# Patient Record
Sex: MALE | Race: WHITE | NOT HISPANIC OR LATINO | Employment: STUDENT | URBAN - METROPOLITAN AREA
[De-identification: names, ages, dates, MRNs, and addresses within clinical notes are randomized per-mention and may not be internally consistent; named-entity substitution may affect disease eponyms.]

---
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
## 2020-01-17 ENCOUNTER — HOSPITAL ENCOUNTER (EMERGENCY)
Facility: HOSPITAL | Age: 20
Discharge: HOME | End: 2020-01-17
Attending: EMERGENCY MEDICINE
Payer: COMMERCIAL

## 2020-01-17 ENCOUNTER — APPOINTMENT (EMERGENCY)
Dept: RADIOLOGY | Facility: HOSPITAL | Age: 20
End: 2020-01-17
Attending: STUDENT IN AN ORGANIZED HEALTH CARE EDUCATION/TRAINING PROGRAM
Payer: COMMERCIAL

## 2020-01-17 VITALS
DIASTOLIC BLOOD PRESSURE: 60 MMHG | TEMPERATURE: 97.9 F | BODY MASS INDEX: 21 KG/M2 | SYSTOLIC BLOOD PRESSURE: 134 MMHG | RESPIRATION RATE: 17 BRPM | WEIGHT: 150 LBS | HEART RATE: 90 BPM | OXYGEN SATURATION: 97 % | HEIGHT: 71 IN

## 2020-01-17 DIAGNOSIS — T78.40XA ALLERGIC REACTION, INITIAL ENCOUNTER: Primary | ICD-10-CM

## 2020-01-17 DIAGNOSIS — R06.02 SHORTNESS OF BREATH: ICD-10-CM

## 2020-01-17 PROCEDURE — 96374 THER/PROPH/DIAG INJ IV PUSH: CPT

## 2020-01-17 PROCEDURE — 63700000 HC SELF-ADMINISTRABLE DRUG: Performed by: STUDENT IN AN ORGANIZED HEALTH CARE EDUCATION/TRAINING PROGRAM

## 2020-01-17 PROCEDURE — 3E0333Z INTRODUCTION OF ANTI-INFLAMMATORY INTO PERIPHERAL VEIN, PERCUTANEOUS APPROACH: ICD-10-PCS | Performed by: EMERGENCY MEDICINE

## 2020-01-17 PROCEDURE — 71046 X-RAY EXAM CHEST 2 VIEWS: CPT

## 2020-01-17 PROCEDURE — 25000000 HC PHARMACY GENERAL: Performed by: STUDENT IN AN ORGANIZED HEALTH CARE EDUCATION/TRAINING PROGRAM

## 2020-01-17 PROCEDURE — 86618 LYME DISEASE ANTIBODY: CPT | Performed by: STUDENT IN AN ORGANIZED HEALTH CARE EDUCATION/TRAINING PROGRAM

## 2020-01-17 PROCEDURE — 63600000 HC DRUGS/DETAIL CODE: Performed by: STUDENT IN AN ORGANIZED HEALTH CARE EDUCATION/TRAINING PROGRAM

## 2020-01-17 PROCEDURE — 36415 COLL VENOUS BLD VENIPUNCTURE: CPT | Performed by: STUDENT IN AN ORGANIZED HEALTH CARE EDUCATION/TRAINING PROGRAM

## 2020-01-17 PROCEDURE — 99284 EMERGENCY DEPT VISIT MOD MDM: CPT | Mod: 25

## 2020-01-17 RX ORDER — DOXYCYCLINE 40 MG/1
40 CAPSULE ORAL DAILY
COMMUNITY
Start: 2017-11-21 | End: 2020-06-29

## 2020-01-17 RX ORDER — PREDNISONE 10 MG/1
50 TABLET ORAL DAILY
Qty: 20 TABLET | Refills: 0 | Status: SHIPPED | OUTPATIENT
Start: 2020-01-17 | End: 2020-06-29

## 2020-01-17 RX ORDER — DIPHENHYDRAMINE HCL 25 MG
50 CAPSULE ORAL ONCE
Status: COMPLETED | OUTPATIENT
Start: 2020-01-17 | End: 2020-01-17

## 2020-01-17 RX ORDER — IPRATROPIUM BROMIDE AND ALBUTEROL SULFATE 2.5; .5 MG/3ML; MG/3ML
3 SOLUTION RESPIRATORY (INHALATION) ONCE
Status: COMPLETED | OUTPATIENT
Start: 2020-01-17 | End: 2020-01-17

## 2020-01-17 RX ORDER — FLUOXETINE 10 MG/1
10 CAPSULE ORAL DAILY
COMMUNITY
End: 2020-01-29

## 2020-01-17 RX ADMIN — DIPHENHYDRAMINE HYDROCHLORIDE 50 MG: 25 CAPSULE ORAL at 20:30

## 2020-01-17 RX ADMIN — IPRATROPIUM BROMIDE AND ALBUTEROL SULFATE 3 ML: 2.5; .5 SOLUTION RESPIRATORY (INHALATION) at 20:08

## 2020-01-17 RX ADMIN — METHYLPREDNISOLONE SODIUM SUCCINATE 125 MG: 125 INJECTION, POWDER, FOR SOLUTION INTRAMUSCULAR; INTRAVENOUS at 20:06

## 2020-01-17 ASSESSMENT — ENCOUNTER SYMPTOMS
PALPITATIONS: 0
DIFFICULTY URINATING: 0
HEMATURIA: 0
NUMBNESS: 0
SEIZURES: 0
COUGH: 0
ABDOMINAL PAIN: 0
SHORTNESS OF BREATH: 0
VOICE CHANGE: 0
HEADACHES: 1
WEAKNESS: 0
FEVER: 0
BACK PAIN: 0
APPETITE CHANGE: 0
DYSURIA: 0
EYE PAIN: 0
VOMITING: 0
COLOR CHANGE: 0
NAUSEA: 0
ABDOMINAL DISTENTION: 0
TROUBLE SWALLOWING: 1
DIZZINESS: 0
ARTHRALGIAS: 0
CHILLS: 0

## 2020-01-18 NOTE — ED ATTESTATION NOTE
1/17/20209:03 PM  I have personally seen and examined the patient.  I reviewed and agree with the PA/NP/Resident's assessment and plan of care.         Jt Ritter,   01/17/20 2106

## 2020-01-18 NOTE — ED PROVIDER NOTES
HPI     Chief Complaint   Patient presents with   • Allergic Reaction       HPI    Zelalem Kasper is a 19 year old M with PMH of anxiety and small left pneumothorax denting to the emergency room with throat tightening and mild shortness of breath.  Patient reports that he took a new medication, Prozac, this morning and around 7 PM started noticing some mild throat tightening and associated shortness of breath.  Denies any chest pain, abdominal upset, rash or pruritus at this time.  , Patient reports that he has been having a frontal tension felt headache since Monday, states that he went to his PCP who told him to return on Monday for follow-up and further work-up of headache.  Patient denies any visual changes or other focal neurological symptoms at this time. It was not sudden onset, no trauma, not the work headache of his life, gradually getting worse.    Patient History     Past Medical History:   Diagnosis Date   • Acne    • Anxiety    • Pneumothorax        Past Surgical History:   Procedure Laterality Date   • DENTAL SURGERY      wisdom teeth removed       History reviewed. No pertinent family history.    Social History     Tobacco Use   • Smoking status: Never Smoker   • Smokeless tobacco: Never Used   Substance Use Topics   • Alcohol use: No   • Drug use: No       Systems Reviewed from Nursing Triage:          Review of Systems     Review of Systems   Constitutional: Negative for appetite change, chills and fever.   HENT: Positive for trouble swallowing. Negative for congestion, ear pain and voice change.    Eyes: Negative for pain and visual disturbance.   Respiratory: Negative for cough and shortness of breath.    Cardiovascular: Negative for chest pain and palpitations.   Gastrointestinal: Negative for abdominal distention, abdominal pain, nausea and vomiting.   Genitourinary: Negative for difficulty urinating, dysuria and hematuria.   Musculoskeletal: Negative for arthralgias and back pain.   Skin: Negative  "for color change and rash.   Allergic/Immunologic: Negative for immunocompromised state.   Neurological: Positive for headaches. Negative for dizziness, seizures, syncope, weakness and numbness.   All other systems reviewed and are negative.       Physical Exam     ED Triage Vitals [01/17/20 1947]   Temp Heart Rate Resp BP SpO2   36.6 °C (97.9 °F) 90 14 (!) 152/75 100 %      Temp Source Heart Rate Source Patient Position BP Location FiO2 (%) (Set)   Oral -- Sitting Right upper arm --                     Patient Vitals for the past 24 hrs:   BP Temp Temp src Pulse Resp SpO2 Height Weight   01/17/20 1947 (!) 152/75 36.6 °C (97.9 °F) Oral 90 14 100 % -- --   01/17/20 1946 -- -- -- -- -- -- 1.803 m (5' 11\") 68 kg (150 lb)                                       Physical Exam   Constitutional: He is oriented to person, place, and time. He appears well-developed and well-nourished.   HENT:   Head: Normocephalic and atraumatic.   Mouth/Throat: Oropharynx is clear and moist. No oropharyngeal exudate.   No oropharyngeal swelling    Eyes: Conjunctivae are normal.   Neck: Neck supple.   Cardiovascular: Normal rate, regular rhythm and normal heart sounds.   No murmur heard.  Pulmonary/Chest: Effort normal and breath sounds normal. No stridor. No respiratory distress. He has no wheezes. He has no rales.   Tolerating secretions   Abdominal: Soft. There is no tenderness.   Musculoskeletal: He exhibits no edema.   Neurological: He is alert and oriented to person, place, and time.   Cn 2-12 intact  5/5 strength in all extremities  Sensation equal and intact bilaterally   Skin: Skin is warm and dry. No rash noted.   Psychiatric: He has a normal mood and affect.   Nursing note and vitals reviewed.           Procedures    ED Course & MDM     Labs Reviewed - No data to display    X-RAY CHEST 2 VIEWS    (Results Pending)               MDM       Patient is a 19-year-old male with a past medical history of anxiety and left-sided pneumothorax " presenting to the emergency room with throat tightening and intermittent shortness of breath.  Differential includes allergic reaction, pneumothorax, other intrapulmonary pathology.  Patient without any swelling or erythema or exudates on oropharyngeal exam making local swelling or inflammation less likely throat tightening.  Will treat patient for an allergic reaction with Benadryl, steroids, and a DuoNeb.  We will also do chest x-ray to evaluate for any intrapulmonary pathology.    ED Course as of Jan 17 2055 Fri Jan 17, 2020 2042 Patient reports that his shortness of breath and throat tightening has improved     [KM]      ED Course User Index  [KM] Marcia Barrientos MD         Clinical Impressions as of Jan 17 2055   Allergic reaction, initial encounter   Shortness of breath        Marcia Barrientos MD  Resident  01/17/20 2055

## 2020-01-21 LAB — B BURGDOR AB SER IA-ACNC: 0.34 RATIO

## 2020-01-27 ENCOUNTER — TRANSCRIBE ORDERS (OUTPATIENT)
Dept: SCHEDULING | Age: 20
End: 2020-01-27

## 2020-01-27 DIAGNOSIS — R51.9 HEADACHE: Primary | ICD-10-CM

## 2020-01-30 ENCOUNTER — HOSPITAL ENCOUNTER (OUTPATIENT)
Dept: RADIOLOGY | Age: 20
Discharge: HOME | End: 2020-01-30
Attending: FAMILY MEDICINE
Payer: COMMERCIAL

## 2020-01-30 DIAGNOSIS — R51.9 HEADACHE: ICD-10-CM

## 2020-01-30 RX ORDER — GADOBUTROL 604.72 MG/ML
7 INJECTION INTRAVENOUS ONCE
Status: COMPLETED | OUTPATIENT
Start: 2020-01-30 | End: 2020-01-30

## 2020-01-30 RX ADMIN — GADOBUTROL 7 MMOL: 604.72 INJECTION INTRAVENOUS at 16:00

## 2020-02-10 ENCOUNTER — TELEPHONE (OUTPATIENT)
Dept: PRIMARY CARE | Facility: CLINIC | Age: 20
End: 2020-02-10

## 2020-02-10 DIAGNOSIS — R51.9 INTRACTABLE EPISODIC HEADACHE, UNSPECIFIED HEADACHE TYPE: Primary | ICD-10-CM

## 2020-02-11 ENCOUNTER — OFFICE VISIT (OUTPATIENT)
Dept: PRIMARY CARE | Facility: CLINIC | Age: 20
End: 2020-02-11
Payer: COMMERCIAL

## 2020-02-11 VITALS
WEIGHT: 157 LBS | OXYGEN SATURATION: 98 % | TEMPERATURE: 98.1 F | DIASTOLIC BLOOD PRESSURE: 78 MMHG | BODY MASS INDEX: 21.98 KG/M2 | RESPIRATION RATE: 12 BRPM | HEART RATE: 79 BPM | SYSTOLIC BLOOD PRESSURE: 110 MMHG | HEIGHT: 71 IN

## 2020-02-11 DIAGNOSIS — R00.0 TACHYCARDIA: ICD-10-CM

## 2020-02-11 DIAGNOSIS — J45.40 MODERATE PERSISTENT ASTHMA WITHOUT COMPLICATION: Primary | ICD-10-CM

## 2020-02-11 DIAGNOSIS — Z13.220 LIPID SCREENING: ICD-10-CM

## 2020-02-11 DIAGNOSIS — F41.0 PANIC DISORDER: ICD-10-CM

## 2020-02-11 PROBLEM — J93.9 PNEUMOTHORAX ON LEFT: Status: RESOLVED | Noted: 2019-03-15 | Resolved: 2020-02-11

## 2020-02-11 PROBLEM — L70.9 ACNE: Status: ACTIVE | Noted: 2020-02-11

## 2020-02-11 PROCEDURE — 99213 OFFICE O/P EST LOW 20 MIN: CPT | Performed by: FAMILY MEDICINE

## 2020-02-11 RX ORDER — FEXOFENADINE HCL 60 MG/1
TABLET, FILM COATED ORAL AS NEEDED
COMMUNITY
End: 2020-06-29

## 2020-02-11 RX ORDER — PAROXETINE 30 MG/1
30 TABLET, FILM COATED ORAL DAILY
Qty: 30 TABLET | Refills: 5 | Status: SHIPPED | OUTPATIENT
Start: 2020-02-11 | End: 2020-05-04

## 2020-02-11 RX ORDER — CLINDAMYCIN PHOSPHATE 11.9 MG/ML
1 SOLUTION TOPICAL 2 TIMES DAILY
COMMUNITY
Start: 2017-02-22 | End: 2022-08-17

## 2020-02-11 RX ORDER — ADAPALENE 1 MG/G
1 GEL TOPICAL DAILY
COMMUNITY
Start: 2017-05-24 | End: 2022-08-17

## 2020-02-11 RX ORDER — ADAPALENE AND BENZOYL PEROXIDE GEL, 0.1%/2.5% 1; 25 MG/G; MG/G
1 GEL TOPICAL EVERY OTHER DAY
COMMUNITY
Start: 2016-11-30 | End: 2020-06-29

## 2020-02-11 RX ORDER — DESOXIMETASONE 2.5 MG/G
OINTMENT TOPICAL 2 TIMES DAILY PRN
COMMUNITY
Start: 2017-05-24 | End: 2020-06-29

## 2020-02-11 RX ORDER — CEFUROXIME AXETIL 250 MG/1
250 TABLET ORAL
COMMUNITY
Start: 2020-01-01 | End: 2020-06-29

## 2020-02-11 ASSESSMENT — ENCOUNTER SYMPTOMS
FEVER: 0
BACK PAIN: 0
DIARRHEA: 0
JOINT SWELLING: 0
VOMITING: 0
CHEST TIGHTNESS: 0
ARTHRALGIAS: 0
ABDOMINAL PAIN: 0
CHILLS: 0
NAUSEA: 0
COUGH: 0
SHORTNESS OF BREATH: 0
PALPITATIONS: 0
WHEEZING: 0
SORE THROAT: 0
MYALGIAS: 0
CONSTIPATION: 0
FATIGUE: 0

## 2020-02-11 NOTE — PROGRESS NOTES
Subjective      Patient ID: Richard Kasper is a 20 y.o. male.    Here for a follow-up.  Notes that he is tolerating the peroxide teen and he thinks it is helping.  He is no longer getting the headaches but he is still getting tachycardia with minimal exertion.  His heart rate will go up to 150 at times.  He wondered if it was the medicine but he actually mentioned this prior to him starting the medicine at the last visit.  He has seen a therapist for the first time tonight and will be going back and following up on that.  He had the MRI done which was okay and luckily his headaches have resolved anyway with the medicine.      Past Medical History:   Diagnosis Date   • Acne    • Asthma    • Moderate persistent asthma without complication 2020   • Panic disorder 2020   • Pneumothorax on left 3/15/2019       Past Surgical History:   Procedure Laterality Date   • DENTAL SURGERY      wisdom teeth removed       Current Outpatient Medications on File Prior to Visit   Medication Sig Dispense Refill   • adapalene (DIFFERIN) 0.1 % topical gel Apply 1 application topically daily.     • adapalene-benzoyl peroxide 0.1-2.5 % gel with pump 1 application every other day.     • desoximetasone (TOPICORT) 0.25 % ointment Apply topically 2 (two) times a day as needed.     • fexofenadine (ALLEGRA ALLERGY) 60 mg tablet Take by mouth as needed.     • [] acetaminophen (TYLENOL) 325 mg tablet Take 3 tablets (975 mg total) by mouth every 8 (eight) hours. 90 tablet 0   • doxycycline (ORACEA) 40 mg capsule Take 40 mg by mouth daily.     • predniSONE (DELTASONE) 10 mg tablet Take 5 tablets (50 mg total) by mouth daily for 4 days. Take 50 tablets daily for the next 4 days 20 tablet 0     No current facility-administered medications on file prior to visit.        No Known Allergies    Social History     Socioeconomic History   • Marital status: Single     Spouse name: Not on file   • Number of children: Not on file   • Years of  education: Not on file   • Highest education level: Not on file   Occupational History   • Not on file   Social Needs   • Financial resource strain: Not on file   • Food insecurity:     Worry: Not on file     Inability: Not on file   • Transportation needs:     Medical: Not on file     Non-medical: Not on file   Tobacco Use   • Smoking status: Never Smoker   • Smokeless tobacco: Never Used   Substance and Sexual Activity   • Alcohol use: No   • Drug use: No   • Sexual activity: Defer   Lifestyle   • Physical activity:     Days per week: Not on file     Minutes per session: Not on file   • Stress: Not on file   Relationships   • Social connections:     Talks on phone: Not on file     Gets together: Not on file     Attends Faith service: Not on file     Active member of club or organization: Not on file     Attends meetings of clubs or organizations: Not on file     Relationship status: Not on file   • Intimate partner violence:     Fear of current or ex partner: Not on file     Emotionally abused: Not on file     Physically abused: Not on file     Forced sexual activity: Not on file   Other Topics Concern   • Not on file   Social History Narrative   • Not on file       Family History   Problem Relation Age of Onset   • Hypothyroidism Biological Mother    • Hyperlipidemia Biological Father    • Diverticulosis Biological Father    • Hyperlipidemia Biological Sister    • Hypothyroidism Maternal Grandfather    • Stomach cancer Maternal Grandfather          The following have been reviewed and updated as appropriate in this visit:  Allergies  Meds  Problems  Med Hx         Review of Systems   Constitutional: Negative for chills, fatigue and fever.   HENT: Negative for ear pain, hearing loss, nosebleeds, sore throat and tinnitus.    Respiratory: Negative for cough, chest tightness, shortness of breath and wheezing.    Cardiovascular: Negative for chest pain, palpitations and leg swelling.   Gastrointestinal:  "Negative for abdominal pain, constipation, diarrhea, nausea and vomiting.   Musculoskeletal: Negative for arthralgias, back pain, joint swelling and myalgias.       Visit Vitals  /78 (BP Location: Left upper arm, Patient Position: Sitting)   Pulse 79   Temp 36.7 °C (98.1 °F) (Oral)   Resp 12   Ht 1.803 m (5' 11\")   Wt 71.2 kg (157 lb)   SpO2 98%   BMI 21.90 kg/m²       Objective     Physical Exam   Constitutional: He appears well-developed and well-nourished.   HENT:   Nose: No septal deviation.   Mouth/Throat: Mucous membranes are normal. No oral lesions. Normal dentition.   Neck: Normal range of motion. Neck supple. No JVD present. No thyromegaly present.   Cardiovascular: Normal rate, regular rhythm, normal heart sounds and intact distal pulses.   No murmur heard.  Pulmonary/Chest: Breath sounds normal. He has no wheezes. He has no rhonchi. He has no rales. He exhibits no tenderness.   Abdominal: Soft. Bowel sounds are normal. He exhibits no distension. There is no tenderness.   Musculoskeletal: He exhibits no edema.   Lymphadenopathy:        Right: No inguinal adenopathy present.        Left: No inguinal adenopathy present.       Assessment/Plan   Problem List Items Addressed This Visit        High    Moderate persistent asthma without complication - Primary     Stable.  Continue current plan.            Medium    Panic disorder     Will increase dose slightly and follow-up in 6 weeks.         Relevant Medications    fexofenadine (ALLEGRA ALLERGY) 60 mg tablet    PARoxetine (PAXIL) 30 mg tablet       Low    Tachycardia     He should have an echocardiogram just to be sure there is no functional cardiac issues.  I suggest we have him see a cardiologist expedite this.         Relevant Orders    Ambulatory referral to Cardiology       Unprioritized    RESOLVED: Lipid screening     There is a strong family history with both parents of hyperlipidemia and he has not had a baseline thus far.  He will have the labs " done as an outpatient fasting.         Relevant Orders    Lipid Prof w Refl            Written education and action steps suggested to the patient are documented in After Visit Summary / Patient Instructions sections of this encounter.    [unfilled]

## 2020-02-12 NOTE — ASSESSMENT & PLAN NOTE
He should have an echocardiogram just to be sure there is no functional cardiac issues.  I suggest we have him see a cardiologist expedite this.

## 2020-02-12 NOTE — ASSESSMENT & PLAN NOTE
There is a strong family history with both parents of hyperlipidemia and he has not had a baseline thus far.  He will have the labs done as an outpatient fasting.

## 2020-05-04 ENCOUNTER — TELEPHONE (OUTPATIENT)
Dept: PRIMARY CARE | Facility: CLINIC | Age: 20
End: 2020-05-04

## 2020-05-04 DIAGNOSIS — F41.0 PANIC DISORDER: ICD-10-CM

## 2020-05-04 RX ORDER — PAROXETINE 30 MG/1
15 TABLET, FILM COATED ORAL DAILY
Qty: 30 TABLET | Refills: 5 | COMMUNITY
Start: 2020-05-04 | End: 2020-11-27

## 2020-05-04 NOTE — TELEPHONE ENCOUNTER
Spoke with Richard.  It sounds like he has gotten involved with a girl and is now having side effects probably from the Paxil, including delayed ejaculation, and some erectile dysfunction.  He was not sure if it was the medicine or perhaps his testosterone level was low.  I told him the better chance was it was the medication side effects.  We will cut his dose in half and see if that still amply controls his anxiety well hopefully diminishing side effects.  If not we can change the medication and he will let me know.

## 2020-05-04 NOTE — TELEPHONE ENCOUNTER
Patient would like to know if you would call him back. He has a question regarding his medication and stated that it is confidential.

## 2020-06-29 ENCOUNTER — OFFICE VISIT (OUTPATIENT)
Dept: PRIMARY CARE | Facility: CLINIC | Age: 20
End: 2020-06-29
Payer: COMMERCIAL

## 2020-06-29 VITALS
OXYGEN SATURATION: 99 % | HEART RATE: 84 BPM | WEIGHT: 166 LBS | SYSTOLIC BLOOD PRESSURE: 130 MMHG | DIASTOLIC BLOOD PRESSURE: 80 MMHG | RESPIRATION RATE: 16 BRPM | TEMPERATURE: 97 F | BODY MASS INDEX: 23.15 KG/M2

## 2020-06-29 DIAGNOSIS — J02.9 ACUTE PHARYNGITIS, UNSPECIFIED ETIOLOGY: Primary | ICD-10-CM

## 2020-06-29 PROCEDURE — 200200 PR NO CHARGE: Performed by: FAMILY MEDICINE

## 2020-06-29 RX ORDER — CEFUROXIME AXETIL 250 MG/1
250 TABLET ORAL 2 TIMES DAILY
COMMUNITY
Start: 2020-06-29 | End: 2022-08-17

## 2020-06-29 RX ORDER — FEXOFENADINE HCL 60 MG/1
TABLET, FILM COATED ORAL 2 TIMES DAILY PRN
COMMUNITY
Start: 2020-06-29 | End: 2023-03-19

## 2020-06-29 RX ORDER — AMOXICILLIN AND CLAVULANATE POTASSIUM 875; 125 MG/1; MG/1
1 TABLET, FILM COATED ORAL 2 TIMES DAILY
Qty: 20 TABLET | Refills: 0 | Status: SHIPPED | OUTPATIENT
Start: 2020-06-29 | End: 2020-07-09

## 2020-06-29 ASSESSMENT — ENCOUNTER SYMPTOMS
JOINT SWELLING: 0
ABDOMINAL PAIN: 0
FEVER: 0
CHILLS: 0
BACK PAIN: 0
CHEST TIGHTNESS: 0
CONSTIPATION: 0
COUGH: 0
SORE THROAT: 1
DIARRHEA: 0
VOMITING: 0
MYALGIAS: 0
SINUS PRESSURE: 0
ARTHRALGIAS: 0
FATIGUE: 0
NAUSEA: 0
RHINORRHEA: 0
PALPITATIONS: 0
SHORTNESS OF BREATH: 0
WHEEZING: 0

## 2020-06-29 NOTE — PROGRESS NOTES
Family Medicine  [facility]         The following have been reviewed and updated as appropriate in this visit:    Subjective      Patient ID: Richard Kasper is a 20 y.o. male.    Patient is here today complaining of a 3-day history of a sore throat with some white spots on his tonsils.  He has had strep before and it feels a little like that but not as severe.  He also denies any other constitutional symptoms or URI symptoms.      Past Medical History:   Diagnosis Date   • Acne    • Asthma    • Moderate persistent asthma without complication 2/11/2020   • Panic disorder 2/11/2020   • Pneumothorax on left 3/15/2019       Past Surgical History:   Procedure Laterality Date   • DENTAL SURGERY      wisdom teeth removed       Current Outpatient Medications   Medication Sig Dispense Refill   • adapalene (DIFFERIN) 0.1 % topical gel Apply 1 application topically daily.     • cefUROXime (CEFTIN) 250 mg tablet Take 1 tablet (250 mg total) by mouth every other day.     • clindamycin (CLEOCIN T) 1 % external solution Apply 1 application topically 2 times daily.     • fexofenadine (ALLEGRA ALLERGY) 60 mg tablet Take by mouth 2 (two) times a day as needed.     • PARoxetine (PAXIL) 30 mg tablet Take 0.5 tablets (15 mg total) by mouth daily. 30 tablet 5   • amoxicillin-pot clavulanate (AUGMENTIN) 875-125 mg per tablet Take 1 tablet by mouth 2 (two) times a day for 10 days. Take with food 20 tablet 0     No current facility-administered medications for this visit.        No Known Allergies    Social History     Socioeconomic History   • Marital status: Single     Spouse name: Not on file   • Number of children: Not on file   • Years of education: Not on file   • Highest education level: Not on file   Occupational History   • Not on file   Social Needs   • Financial resource strain: Not on file   • Food insecurity:     Worry: Not on file     Inability: Not on file   • Transportation needs:      Medical: Not on file     Non-medical: Not on file   Tobacco Use   • Smoking status: Never Smoker   • Smokeless tobacco: Never Used   Substance and Sexual Activity   • Alcohol use: No   • Drug use: No   • Sexual activity: Defer   Lifestyle   • Physical activity:     Days per week: Not on file     Minutes per session: Not on file   • Stress: Not on file   Relationships   • Social connections:     Talks on phone: Not on file     Gets together: Not on file     Attends Gnosticism service: Not on file     Active member of club or organization: Not on file     Attends meetings of clubs or organizations: Not on file     Relationship status: Not on file   • Intimate partner violence:     Fear of current or ex partner: Not on file     Emotionally abused: Not on file     Physically abused: Not on file     Forced sexual activity: Not on file   Other Topics Concern   • Not on file   Social History Narrative   • Not on file       Family History   Problem Relation Age of Onset   • Hypothyroidism Biological Mother    • Hyperlipidemia Biological Father    • Diverticulosis Biological Father    • Hyperlipidemia Biological Sister    • Hypothyroidism Maternal Grandfather    • Stomach cancer Maternal Grandfather          The following have been reviewed and updated as appropriate in this visit:  Allergies  Meds  Problems         Review of Systems   Constitutional: Negative for chills, fatigue and fever.   HENT: Positive for sore throat. Negative for congestion, ear pain, hearing loss, nosebleeds, postnasal drip, rhinorrhea, sinus pressure and tinnitus.    Respiratory: Negative for cough, chest tightness, shortness of breath and wheezing.    Cardiovascular: Negative for chest pain, palpitations and leg swelling.   Gastrointestinal: Negative for abdominal pain, constipation, diarrhea, nausea and vomiting.   Musculoskeletal: Negative for arthralgias, back pain, joint swelling and myalgias.         Objective     Visit Vitals  /80 (BP  Location: Left upper arm, Patient Position: Sitting)   Pulse 84   Temp 36.1 °C (97 °F)   Resp 16   Wt 75.3 kg (166 lb)   SpO2 99%   BMI 23.15 kg/m²       Physical Exam   Constitutional: He appears well-developed and well-nourished.   HENT:   Right Ear: Tympanic membrane and ear canal normal.   Left Ear: Tympanic membrane and ear canal normal.   Nose: Nose normal.   Mouth/Throat: Uvula is midline and mucous membranes are normal. No oral lesions. No dental caries. Oropharyngeal exudate and posterior oropharyngeal erythema present. No posterior oropharyngeal edema.   Pharynx erythematous, mildly inflamed, with small exudative areas of his tonsils of the tonsils are not grossly enlarged.   Neck: Normal range of motion. Neck supple. No JVD present. No thyromegaly present.   Cardiovascular: Normal rate, regular rhythm, normal heart sounds and intact distal pulses.   No murmur heard.  Pulmonary/Chest: Breath sounds normal. He has no wheezes. He has no rhonchi. He has no rales. He exhibits no tenderness.   Abdominal: Soft. Bowel sounds are normal. He exhibits no distension. There is no tenderness.   Musculoskeletal: He exhibits no edema.   Nursing note and vitals reviewed.        Assessment/Plan   Problem List Items Addressed This Visit        Unprioritized    RESOLVED: Acute pharyngitis - Primary     Check throat culture.  We will empirically treat though this still could be viral.         Relevant Medications    amoxicillin-pot clavulanate (AUGMENTIN) 875-125 mg per tablet    Other Relevant Orders    Upper respiratory culture          Return if symptoms worsen or fail to improve.       Written education and action steps suggested to the patient are documented in After Visit Summary / Patient Instructions sections of this encounter.        Brayden Laguerre DO, DUNCAN

## 2020-07-01 ENCOUNTER — TELEPHONE (OUTPATIENT)
Dept: PRIMARY CARE | Facility: CLINIC | Age: 20
End: 2020-07-01

## 2020-07-01 LAB
BACTERIA SPEC RESP CULT: NORMAL
BACTERIA SPEC RESP CULT: NORMAL

## 2020-07-01 NOTE — TELEPHONE ENCOUNTER
----- Message from Brayden Laguerre DO sent at 7/1/2020  3:55 PM EDT -----  Call patient, Notify throat culture is okay, No changes. Still finish antibiotic as precaution.

## 2021-02-16 ENCOUNTER — OFFICE VISIT (OUTPATIENT)
Dept: PRIMARY CARE | Facility: CLINIC | Age: 21
End: 2021-02-16
Payer: COMMERCIAL

## 2021-02-16 VITALS
SYSTOLIC BLOOD PRESSURE: 120 MMHG | RESPIRATION RATE: 12 BRPM | HEIGHT: 71 IN | HEART RATE: 88 BPM | DIASTOLIC BLOOD PRESSURE: 72 MMHG | OXYGEN SATURATION: 99 % | BODY MASS INDEX: 21.84 KG/M2 | TEMPERATURE: 97.4 F | WEIGHT: 156 LBS

## 2021-02-16 DIAGNOSIS — N30.00 ACUTE CYSTITIS WITHOUT HEMATURIA: ICD-10-CM

## 2021-02-16 DIAGNOSIS — R35.0 FREQUENT URINATION: ICD-10-CM

## 2021-02-16 DIAGNOSIS — R10.9 ABDOMINAL PRESSURE: Primary | ICD-10-CM

## 2021-02-16 DIAGNOSIS — J45.40 MODERATE PERSISTENT ASTHMA WITHOUT COMPLICATION: ICD-10-CM

## 2021-02-16 LAB
BACTERIA, POC: ABNORMAL
BILIRUBIN, POC: NEGATIVE
BLOOD URINE, POC: ABNORMAL
CLARITY, POC: CLEAR
COLOR, POC: YELLOW
EXPIRATION DATE: ABNORMAL
GLUCOSE URINE, POC: NEGATIVE
KETONES, POC: NEGATIVE
LEUKOCYTE EST, POC: ABNORMAL
Lab: ABNORMAL
NITRITE, POC: NEGATIVE
PH, POC: 8
POCT MANUFACTURER: ABNORMAL
PROTEIN, POC: NEGATIVE
SPECIFIC GRAVITY, POC: 1
UROBILINOGEN, POC: 0.2

## 2021-02-16 PROCEDURE — 81002 URINALYSIS NONAUTO W/O SCOPE: CPT | Performed by: NURSE PRACTITIONER

## 2021-02-16 PROCEDURE — 99213 OFFICE O/P EST LOW 20 MIN: CPT | Performed by: NURSE PRACTITIONER

## 2021-02-16 RX ORDER — CIPROFLOXACIN 500 MG/1
500 TABLET ORAL 2 TIMES DAILY
Qty: 20 TABLET | Refills: 0 | Status: SHIPPED | OUTPATIENT
Start: 2021-02-16 | End: 2021-02-26

## 2021-02-16 RX ORDER — TRETINOIN 0.25 MG/G
CREAM TOPICAL
COMMUNITY
Start: 2020-12-03 | End: 2022-08-17

## 2021-02-16 ASSESSMENT — ENCOUNTER SYMPTOMS
CONSTITUTIONAL NEGATIVE: 1
GASTROINTESTINAL NEGATIVE: 1
CARDIOVASCULAR NEGATIVE: 1
RESPIRATORY NEGATIVE: 1
FREQUENCY: 1

## 2021-02-16 ASSESSMENT — PATIENT HEALTH QUESTIONNAIRE - PHQ9: SUM OF ALL RESPONSES TO PHQ9 QUESTIONS 1 & 2: 0

## 2021-02-16 NOTE — ASSESSMENT & PLAN NOTE
Treatment per orders- antibiotics, urinanalysis and culture sent to lab. Also push fluids, may use Pyridium OTC prn. Call or return to the office prn if these symptoms worsen or fail to improve as anticipated.

## 2021-02-16 NOTE — PROGRESS NOTES
Family Medicine  [facility]         The following have been reviewed and updated as appropriate in this visit:    Subjective      Patient ID: Richard Kasper is a 21 y.o. male.    Pt presents with c/o abdominal pressure and urinary frequency for the last 4 days. He denies low back pain or flank pain. No pain on urination. He denies penile discharge.       Past Medical History:   Diagnosis Date   • Acne    • Asthma    • Moderate persistent asthma without complication 2/11/2020   • Panic disorder 2/11/2020   • Pneumothorax on left 3/15/2019       Past Surgical History:   Procedure Laterality Date   • DENTAL SURGERY      wisdom teeth removed       Current Outpatient Medications   Medication Sig Dispense Refill   • adapalene (DIFFERIN) 0.1 % topical gel Apply 1 application topically daily.     • fexofenadine (ALLEGRA ALLERGY) 60 mg tablet Take by mouth 2 (two) times a day as needed.     • PARoxetine (PAXIL) 30 mg tablet Take 1 tablet (30 mg total) by mouth daily. 30 tablet 5   • tretinoin (RETIN-A) 0.025 % cream APPLY TO AFFECTED AREA AT NIGHT     • cefUROXime (CEFTIN) 250 mg tablet Take 1 tablet (250 mg total) by mouth every other day.     • ciprofloxacin (CIPRO) 500 mg tablet Take 1 tablet (500 mg total) by mouth 2 (two) times a day for 10 days. 20 tablet 0   • clindamycin (CLEOCIN T) 1 % external solution Apply 1 application topically 2 times daily.       No current facility-administered medications for this visit.        No Known Allergies    Social History     Socioeconomic History   • Marital status: Single     Spouse name: Not on file   • Number of children: Not on file   • Years of education: Not on file   • Highest education level: Not on file   Occupational History   • Not on file   Social Needs   • Financial resource strain: Not on file   • Food insecurity     Worry: Not on file     Inability: Not on file   • Transportation needs     Medical: Not on file     Non-medical: Not on  "file   Tobacco Use   • Smoking status: Never Smoker   • Smokeless tobacco: Never Used   Substance and Sexual Activity   • Alcohol use: No   • Drug use: No   • Sexual activity: Defer   Lifestyle   • Physical activity     Days per week: Not on file     Minutes per session: Not on file   • Stress: Not on file   Relationships   • Social connections     Talks on phone: Not on file     Gets together: Not on file     Attends [employer] service: Not on file     Active member of club or organization: Not on file     Attends meetings of clubs or organizations: Not on file     Relationship status: Not on file   • Intimate partner violence     Fear of current or ex partner: Not on file     Emotionally abused: Not on file     Physically abused: Not on file     Forced sexual activity: Not on file   Other Topics Concern   • Not on file   Social History Narrative   • Not on file       Family History   Problem Relation Age of Onset   • Hypothyroidism Biological Mother    • Hyperlipidemia Biological Father    • Diverticulosis Biological Father    • Hyperlipidemia Biological Sister    • Hypothyroidism Maternal Grandfather    • Stomach cancer Maternal Grandfather          The following have been reviewed and updated as appropriate in this visit:  Allergies  Meds  Problems         Review of Systems   Constitutional: Negative.    HENT: Negative.    Respiratory: Negative.    Cardiovascular: Negative.    Gastrointestinal: Negative.    Genitourinary: Positive for frequency.   Skin: Negative.          Objective     Visit Vitals  /72 (BP Location: Left upper arm, Patient Position: Sitting)   Pulse 88   Temp 36.3 °C (97.4 °F) (Temporal)   Resp 12   Ht 1.803 m (5' 11\")   Wt 70.8 kg (156 lb)   SpO2 99%   BMI 21.76 kg/m²       Physical Exam  Constitutional:       Appearance: He is well-developed.   HENT:      Head: Normocephalic.      Right Ear: External ear normal.      Left Ear: External ear normal.   Eyes:      Pupils: Pupils are " equal, round, and reactive to light.   Neck:      Musculoskeletal: Neck supple.   Cardiovascular:      Rate and Rhythm: Normal rate and regular rhythm.      Heart sounds: Normal heart sounds.   Pulmonary:      Effort: Pulmonary effort is normal.      Breath sounds: Normal breath sounds.   Abdominal:      General: Bowel sounds are normal.      Palpations: Abdomen is soft.   Skin:     General: Skin is warm and dry.           Assessment/Plan   Problem List Items Addressed This Visit        Nervous    Abdominal pressure - Primary     +suprapubic pressure noted  Get ua and culture  Treat for suspected UTI         Relevant Medications    ciprofloxacin (CIPRO) 500 mg tablet    Other Relevant Orders    POCT urinalysis dipstick (Completed)    Urinalysis with microscopic    Urine culture       Respiratory    Moderate persistent asthma without complication       Genitourinary    Frequent urination     Get ua and culture  Treat for suspected UTI         Relevant Medications    ciprofloxacin (CIPRO) 500 mg tablet    Other Relevant Orders    POCT urinalysis dipstick (Completed)    Urinalysis with microscopic    Urine culture    Acute cystitis without hematuria      Treatment per orders- antibiotics, urinanalysis and culture sent to lab. Also push fluids, may use Pyridium OTC prn. Call or return to the office prn if these symptoms worsen or fail to improve as anticipated.         Relevant Medications    ciprofloxacin (CIPRO) 500 mg tablet    Other Relevant Orders    POCT urinalysis dipstick (Completed)    Urinalysis with microscopic    Urine culture          Return if symptoms worsen or fail to improve.       Written education and action steps suggested to the patient are documented in After Visit Summary / Patient Instructions sections of this encounter.      Matilda DIALLO

## 2021-02-17 LAB
APPEARANCE UR: CLEAR
BACTERIA #/AREA URNS HPF: NORMAL /[HPF]
BILIRUB UR QL STRIP: NEGATIVE
COLOR UR: YELLOW
EPI CELLS #/AREA URNS HPF: NORMAL /HPF (ref 0–10)
GLUCOSE UR QL: NEGATIVE
HGB UR QL STRIP: NEGATIVE
KETONES UR QL STRIP: NEGATIVE
LEUKOCYTE ESTERASE UR QL STRIP: NEGATIVE
MICRO URNS: ABNORMAL
MICRO URNS: ABNORMAL
MUCOUS THREADS URNS QL MICRO: PRESENT
NITRITE UR QL STRIP: NEGATIVE
PH UR STRIP: 8.5 [PH] (ref 5–7.5)
PROT UR QL STRIP: NEGATIVE
RBC #/AREA URNS HPF: NORMAL /HPF (ref 0–2)
SP GR UR: 1.01 (ref 1–1.03)
UROBILINOGEN UR STRIP-MCNC: 0.2 MG/DL (ref 0.2–1)
WBC #/AREA URNS HPF: NORMAL /HPF (ref 0–5)

## 2021-02-18 ENCOUNTER — TELEPHONE (OUTPATIENT)
Dept: PRIMARY CARE | Facility: CLINIC | Age: 21
End: 2021-02-18

## 2021-02-18 LAB
BACTERIA UR CULT: NO GROWTH
BACTERIA UR CULT: NORMAL

## 2021-03-24 PROBLEM — R35.0 FREQUENT URINATION: Status: RESOLVED | Noted: 2021-02-16 | Resolved: 2021-03-24

## 2021-03-24 PROBLEM — R10.9 ABDOMINAL PRESSURE: Status: RESOLVED | Noted: 2021-02-16 | Resolved: 2021-03-24

## 2021-03-24 PROBLEM — N30.00 ACUTE CYSTITIS WITHOUT HEMATURIA: Status: RESOLVED | Noted: 2021-02-16 | Resolved: 2021-03-24

## 2021-03-25 ENCOUNTER — OFFICE VISIT (OUTPATIENT)
Dept: PRIMARY CARE | Facility: CLINIC | Age: 21
End: 2021-03-25
Payer: COMMERCIAL

## 2021-03-25 VITALS
WEIGHT: 184 LBS | SYSTOLIC BLOOD PRESSURE: 112 MMHG | TEMPERATURE: 96 F | RESPIRATION RATE: 16 BRPM | HEIGHT: 71 IN | DIASTOLIC BLOOD PRESSURE: 64 MMHG | OXYGEN SATURATION: 99 % | HEART RATE: 84 BPM | BODY MASS INDEX: 25.76 KG/M2

## 2021-03-25 DIAGNOSIS — F41.0 PANIC DISORDER: ICD-10-CM

## 2021-03-25 DIAGNOSIS — J45.40 MODERATE PERSISTENT ASTHMA WITHOUT COMPLICATION: ICD-10-CM

## 2021-03-25 DIAGNOSIS — K58.2 IRRITABLE BOWEL SYNDROME WITH BOTH CONSTIPATION AND DIARRHEA: ICD-10-CM

## 2021-03-25 DIAGNOSIS — L70.9 ACNE, UNSPECIFIED ACNE TYPE: ICD-10-CM

## 2021-03-25 DIAGNOSIS — K59.00 CONSTIPATION, UNSPECIFIED CONSTIPATION TYPE: Primary | ICD-10-CM

## 2021-03-25 PROCEDURE — 3008F BODY MASS INDEX DOCD: CPT | Performed by: FAMILY MEDICINE

## 2021-03-25 PROCEDURE — 99214 OFFICE O/P EST MOD 30 MIN: CPT | Performed by: FAMILY MEDICINE

## 2021-03-25 RX ORDER — DICYCLOMINE HYDROCHLORIDE 20 MG/1
20 TABLET ORAL 4 TIMES DAILY PRN
Qty: 60 TABLET | Refills: 0 | Status: SHIPPED | OUTPATIENT
Start: 2021-03-25 | End: 2023-03-19

## 2021-03-25 SDOH — HEALTH STABILITY: MENTAL HEALTH: HOW OFTEN DO YOU HAVE SIX OR MORE DRINKS ON ONE OCCASION?: NEVER

## 2021-03-25 SDOH — HEALTH STABILITY: MENTAL HEALTH: HOW MANY DRINKS CONTAINING ALCOHOL DO YOU HAVE ON A TYPICAL DAY WHEN YOU ARE DRINKING?: 1 OR 2

## 2021-03-25 SDOH — HEALTH STABILITY: MENTAL HEALTH: HOW OFTEN DO YOU HAVE A DRINK CONTAINING ALCOHOL?: 2-4 TIMES A MONTH

## 2021-03-25 ASSESSMENT — ENCOUNTER SYMPTOMS
WHEEZING: 0
SHORTNESS OF BREATH: 0
NAUSEA: 0
FEVER: 0
VOMITING: 0
BACK PAIN: 0
PALPITATIONS: 0
DIARRHEA: 1
ABDOMINAL PAIN: 0
ARTHRALGIAS: 0
CHEST TIGHTNESS: 0
COUGH: 0
MYALGIAS: 0
JOINT SWELLING: 0
FATIGUE: 0
CHILLS: 0
CONSTIPATION: 1
SORE THROAT: 0

## 2021-03-25 NOTE — PROGRESS NOTES
Family Medicine  [facility] [address]         The following have been reviewed and updated as appropriate in this visit:    Subjective      Patient ID: Richard Kasper is a 21 y.o. male.    Here today complaining of a 2-week history of mild constipation where he is not fully evacuating but also is still moving his bowels.  He normally has no trouble moving his bowels and even has an episode of loose stools every morning when he first gets up.  He notes that he wakes up feeling slight nausea and rumbling in his abdomen, has a bowel movement, and then feels better.  He also chronically has what he is describing that sounds like tenesmus, a yellowy, mucousy substance.  He denies any change in activity or diet and thinks he does eat a very well-balanced diet which is rich in fiber.  He feels his anxiety has been well controlled as well.      Past Medical History:   Diagnosis Date   • Acne    • Asthma    • Moderate persistent asthma without complication 2/11/2020   • Panic disorder 2/11/2020   • Pneumothorax on left 3/15/2019       Past Surgical History:   Procedure Laterality Date   • DENTAL SURGERY      wisdom teeth removed       Current Outpatient Medications   Medication Sig Dispense Refill   • adapalene (DIFFERIN) 0.1 % topical gel Apply 1 application topically daily.     • cefUROXime (CEFTIN) 250 mg tablet Take 250 mg by mouth 2 (two) times a day.       • clindamycin (CLEOCIN T) 1 % external solution Apply 1 application topically 2 times daily.     • fexofenadine (ALLEGRA ALLERGY) 60 mg tablet Take by mouth 2 (two) times a day as needed.     • PARoxetine (PAXIL) 30 mg tablet Take 1 tablet (30 mg total) by mouth daily. 30 tablet 5   • tretinoin (RETIN-A) 0.025 % cream APPLY TO AFFECTED AREA AT NIGHT     • dicyclomine (BENTYL) 20 mg tablet Take 1 tablet (20 mg total) by mouth 4 (four) times a day as needed (diarrhea/irritable bowels). 60 tablet 0     No current facility-administered medications  for this visit.        No Known Allergies    Social History     Socioeconomic History   • Marital status: Single     Spouse name: Not on file   • Number of children: Not on file   • Years of education: Not on file   • Highest education level: Not on file   Occupational History   • Not on file   Social Needs   • Financial resource strain: Not on file   • Food insecurity     Worry: Not on file     Inability: Not on file   • Transportation needs     Medical: Not on file     Non-medical: Not on file   Tobacco Use   • Smoking status: Never Smoker   • Smokeless tobacco: Never Used   Substance and Sexual Activity   • Alcohol use: Yes     Frequency: 2-4 times a month     Drinks per session: 1 or 2     Binge frequency: Never   • Drug use: No   • Sexual activity: Defer   Lifestyle   • Physical activity     Days per week: Not on file     Minutes per session: Not on file   • Stress: Not on file   Relationships   • Social connections     Talks on phone: Not on file     Gets together: Not on file     Attends [employer] service: Not on file     Active member of club or organization: Not on file     Attends meetings of clubs or organizations: Not on file     Relationship status: Not on file   • Intimate partner violence     Fear of current or ex partner: Not on file     Emotionally abused: Not on file     Physically abused: Not on file     Forced sexual activity: Not on file   Other Topics Concern   • Not on file   Social History Narrative   • Not on file       Family History   Problem Relation Age of Onset   • Hypothyroidism Biological Mother    • Hyperlipidemia Biological Father    • Diverticulosis Biological Father    • Hyperlipidemia Biological Sister    • Hypothyroidism Maternal Grandfather    • Stomach cancer Maternal Grandfather          The following have been reviewed and updated as appropriate in this visit:  Allergies  Meds  Problems         Review of Systems   Constitutional: Negative for chills, fatigue and fever.  "  HENT: Negative for ear pain, hearing loss, nosebleeds, sore throat and tinnitus.    Respiratory: Negative for cough, chest tightness, shortness of breath and wheezing.    Cardiovascular: Negative for chest pain, palpitations and leg swelling.   Gastrointestinal: Positive for constipation and diarrhea. Negative for abdominal pain, nausea and vomiting.        Intermittent, per HPI.   Musculoskeletal: Negative for arthralgias, back pain, joint swelling and myalgias.         Objective     Visit Vitals  /64 (BP Location: Left upper arm, Patient Position: Sitting)   Pulse 84   Temp (!) 35.6 °C (96 °F) (Temporal)   Resp 16   Ht 1.803 m (5' 11\")   Wt 83.5 kg (184 lb)   SpO2 99%   BMI 25.66 kg/m²       Physical Exam  Vitals signs and nursing note reviewed.   Constitutional:       Appearance: He is well-developed.   HENT:      Head: Normocephalic and atraumatic.   Neck:      Musculoskeletal: Normal range of motion and neck supple.      Thyroid: No thyromegaly.      Vascular: No JVD.   Cardiovascular:      Rate and Rhythm: Normal rate and regular rhythm.      Pulses: Normal pulses.      Heart sounds: Normal heart sounds. No murmur.   Pulmonary:      Breath sounds: Normal breath sounds. No wheezing, rhonchi or rales.   Abdominal:      General: Bowel sounds are normal. There is no distension.      Palpations: Abdomen is soft.      Tenderness: There is no abdominal tenderness.   Musculoskeletal: Normal range of motion.         General: No swelling, tenderness or deformity.   Lymphadenopathy:      Cervical: No cervical adenopathy.   Skin:     General: Skin is warm and dry.   Neurological:      General: No focal deficit present.      Mental Status: He is alert and oriented to person, place, and time.   Psychiatric:         Mood and Affect: Mood and affect normal.           Assessment/Plan   Problem List Items Addressed This Visit        High    Moderate persistent asthma without complication     Stable.  Continue current " treatment plan.            Medium    Acne     Stable.  Continue current medication and follow with dermatology.         Irritable bowel syndrome with both constipation and diarrhea     Suspect that he has some mild IBS.  Discussed in detail.  Nothing concerning.  For now would avoid over-the-counter laxatives but may need to use something like Metamucil or Senokot if this continues.  Continue with diet rich in fiber.  Rx given to use as needed.         Relevant Medications    dicyclomine (BENTYL) 20 mg tablet       Low    Panic disorder     Stable.  Continue current medication.            Unprioritized    RESOLVED: Constipation - Primary     Not even sure this is truly constipation but plan as noted above.               Return if symptoms worsen or fail to improve.       Written education and action steps suggested to the patient are documented in After Visit Summary / Patient Instructions sections of this encounter.        Brayden Laguerre DO, TAMIKAP

## 2021-03-25 NOTE — ASSESSMENT & PLAN NOTE
Suspect that he has some mild IBS.  Discussed in detail.  Nothing concerning.  For now would avoid over-the-counter laxatives but may need to use something like Metamucil or Senokot if this continues.  Continue with diet rich in fiber.  Rx given to use as needed.

## 2021-08-21 NOTE — TELEPHONE ENCOUNTER
Pt's mom called and states that napoleon has been feeling pressure and uncomfortable at night. Yesterday pt went mountain biking in [facility] and felt better than ever. Mom is questioning residual side effects after healing? She would like to discuss if maybe a CT or MRI should be done. Please call her at her home number [phone].    English

## 2021-08-30 ENCOUNTER — OFFICE VISIT (OUTPATIENT)
Dept: PRIMARY CARE | Facility: CLINIC | Age: 21
End: 2021-08-30
Payer: COMMERCIAL

## 2021-08-30 VITALS
TEMPERATURE: 98.8 F | HEIGHT: 71 IN | DIASTOLIC BLOOD PRESSURE: 80 MMHG | BODY MASS INDEX: 24.92 KG/M2 | SYSTOLIC BLOOD PRESSURE: 120 MMHG | HEART RATE: 70 BPM | WEIGHT: 178 LBS | OXYGEN SATURATION: 98 % | RESPIRATION RATE: 17 BRPM

## 2021-08-30 DIAGNOSIS — J02.9 PHARYNGITIS, UNSPECIFIED ETIOLOGY: Primary | ICD-10-CM

## 2021-08-30 DIAGNOSIS — J06.9 UPPER RESPIRATORY TRACT INFECTION, UNSPECIFIED TYPE: ICD-10-CM

## 2021-08-30 PROCEDURE — 3008F BODY MASS INDEX DOCD: CPT | Performed by: INTERNAL MEDICINE

## 2021-08-30 PROCEDURE — 99213 OFFICE O/P EST LOW 20 MIN: CPT | Performed by: INTERNAL MEDICINE

## 2021-08-30 PROCEDURE — U0003 INFECTIOUS AGENT DETECTION BY NUCLEIC ACID (DNA OR RNA); SEVERE ACUTE RESPIRATORY SYNDROME CORONAVIRUS 2 (SARS-COV-2) (CORONAVIRUS DISEASE [COVID-19]), AMPLIFIED PROBE TECHNIQUE, MAKING USE OF HIGH THROUGHPUT TECHNOLOGIES AS DESCRIBED BY CMS-2020-01-R: HCPCS | Performed by: INTERNAL MEDICINE

## 2021-08-30 RX ORDER — AZITHROMYCIN 250 MG/1
TABLET, FILM COATED ORAL
Qty: 6 TABLET | Refills: 0 | Status: SHIPPED | OUTPATIENT
Start: 2021-08-30 | End: 2022-08-17

## 2021-08-30 ASSESSMENT — ENCOUNTER SYMPTOMS
APNEA: 0
RHINORRHEA: 1
NAUSEA: 0
VOICE CHANGE: 0
SHORTNESS OF BREATH: 0
ABDOMINAL PAIN: 0
SINUS PRESSURE: 1
SORE THROAT: 1
DIZZINESS: 0
VOMITING: 0
EYE DISCHARGE: 0
DIARRHEA: 0
COUGH: 1
CHILLS: 0
ADENOPATHY: 0
WHEEZING: 0
TROUBLE SWALLOWING: 0
EYE REDNESS: 0
EYE ITCHING: 0
FEVER: 0
CONSTIPATION: 0
MYALGIAS: 0
FATIGUE: 1
HEADACHES: 0
CARDIOVASCULAR NEGATIVE: 1

## 2021-08-30 NOTE — PROGRESS NOTES
Internal Medicine  [facility]         The following have been reviewed and updated as appropriate in this visit:    Subjective      Patient ID: Richard Kasper is a 21 y.o. male.    This patient presents with sore throat and congestion over the past 5 days.  He has been taking over-the-counter medication without relief.  He has had his Covid vaccine.  He states that he has not been around anybody who has tested positive for Covid.  He states that his bosses children have been sick.        Current Outpatient Medications   Medication Sig Dispense Refill   • adapalene (DIFFERIN) 0.1 % topical gel Apply 1 application topically daily.     • clindamycin (CLEOCIN T) 1 % external solution Apply 1 application topically 2 times daily.     • dicyclomine (BENTYL) 20 mg tablet Take 1 tablet (20 mg total) by mouth 4 (four) times a day as needed (diarrhea/irritable bowels). 60 tablet 0   • fexofenadine (ALLEGRA ALLERGY) 60 mg tablet Take by mouth 2 (two) times a day as needed.     • PARoxetine (PAXIL) 30 mg tablet Take 1 tablet (30 mg total) by mouth daily. 30 tablet 5   • tretinoin (RETIN-A) 0.025 % cream APPLY TO AFFECTED AREA AT NIGHT     • azithromycin (ZITHROMAX) 250 mg tablet Take 2 tablets the first day, then 1 tablet daily for 4 days. 6 tablet 0   • cefUROXime (CEFTIN) 250 mg tablet Take 250 mg by mouth 2 (two) times a day.         No current facility-administered medications for this visit.         The following have been reviewed and updated as appropriate in this visit:    Allergies  Meds  Problems         Review of Systems   Constitutional: Positive for fatigue. Negative for chills and fever.   HENT: Positive for congestion, postnasal drip, rhinorrhea, sinus pressure, sneezing and sore throat. Negative for ear pain, hearing loss, nosebleeds, tinnitus, trouble swallowing and voice change.    Eyes: Negative for discharge, redness and itching.   Respiratory: Positive for cough. Negative  "for apnea, shortness of breath and wheezing.    Cardiovascular: Negative.  Negative for chest pain.   Gastrointestinal: Negative for abdominal pain, constipation, diarrhea, nausea and vomiting.   Musculoskeletal: Negative for myalgias.   Neurological: Negative for dizziness and headaches.   Hematological: Negative for adenopathy.         Objective     Visit Vitals  /80 (BP Location: Left upper arm, Patient Position: Sitting)   Pulse 70   Temp 37.1 °C (98.8 °F) (Oral)   Resp 17   Ht 1.803 m (5' 11\")   Wt 80.7 kg (178 lb)   SpO2 98%   BMI 24.83 kg/m²       Physical Exam  Constitutional:       General: He is in acute distress.      Appearance: He is well-developed. He is ill-appearing. He is not diaphoretic.   HENT:      Head: Normocephalic.      Right Ear: No decreased hearing noted. No middle ear effusion. Tympanic membrane is retracted. Tympanic membrane is not injected, perforated or erythematous.      Left Ear: No decreased hearing noted.  No middle ear effusion. Tympanic membrane is retracted. Tympanic membrane is not injected, perforated or erythematous.      Ears:      Comments: Tympanic membranes dull     Nose: Mucosal edema, congestion and rhinorrhea present.      Right Sinus: No maxillary sinus tenderness or frontal sinus tenderness.      Left Sinus: No maxillary sinus tenderness or frontal sinus tenderness.      Mouth/Throat:      Mouth: No oral lesions.      Pharynx: Uvula midline. Posterior oropharyngeal erythema present. No oropharyngeal exudate.      Tonsils: No tonsillar exudate or tonsillar abscesses.   Eyes:      Extraocular Movements: Extraocular movements intact.      Conjunctiva/sclera: Conjunctivae normal.      Pupils: Pupils are equal, round, and reactive to light.   Neck:      Thyroid: No thyroid mass or thyromegaly.      Trachea: Trachea normal.   Cardiovascular:      Rate and Rhythm: Normal rate and regular rhythm.      Heart sounds: Normal heart sounds.   Pulmonary:      Effort: No " accessory muscle usage or respiratory distress.      Breath sounds: Normal breath sounds.   Musculoskeletal:      Cervical back: Neck supple.   Lymphadenopathy:      Head:      Right side of head: No submental or submandibular adenopathy.      Left side of head: No submental or submandibular adenopathy.      Cervical: No cervical adenopathy.      Upper Body:      Right upper body: No supraclavicular adenopathy.      Left upper body: No supraclavicular adenopathy.           Assessment/Plan   Problem List Items Addressed This Visit     None      Visit Diagnoses     Pharyngitis, unspecified etiology    -  Primary    Condition is worsened.  Start Zithromax in case this is strep.  Check Covid swab.    Relevant Medications    azithromycin (ZITHROMAX) 250 mg tablet    Upper respiratory tract infection, unspecified type        Condition is worsened.  Check for COVID-19.    Relevant Medications    azithromycin (ZITHROMAX) 250 mg tablet    Other Relevant Orders    COVID-19 [facility] ([facility])          Return if symptoms worsen or fail to improve.       Written education and action steps suggested to the patient are documented in After Visit Summary / Patient Instructions sections of this encounter.        Vern Spears, DO

## 2021-09-02 ENCOUNTER — TELEPHONE (OUTPATIENT)
Dept: PRIMARY CARE | Facility: CLINIC | Age: 21
End: 2021-09-02

## 2021-09-02 LAB — SARS-COV-2 RNA RESP QL NAA+PROBE: NOT DETECTED

## 2021-09-02 NOTE — TELEPHONE ENCOUNTER
----- Message from Vern Spears DO sent at 9/2/2021  8:10 AM EDT -----  Call patient.  Notify Covid test was negative

## 2021-09-30 DIAGNOSIS — F41.0 PANIC DISORDER: ICD-10-CM

## 2021-09-30 RX ORDER — PAROXETINE 30 MG/1
30 TABLET, FILM COATED ORAL EVERY MORNING
Qty: 90 TABLET | Refills: 1 | Status: SHIPPED | OUTPATIENT
Start: 2021-09-30 | End: 2021-12-27 | Stop reason: SDUPTHER

## 2021-12-27 ENCOUNTER — TELEPHONE (OUTPATIENT)
Dept: PRIMARY CARE | Facility: CLINIC | Age: 21
End: 2021-12-27
Payer: COMMERCIAL

## 2021-12-27 DIAGNOSIS — F41.0 PANIC DISORDER: ICD-10-CM

## 2021-12-27 RX ORDER — PAROXETINE 30 MG/1
30 TABLET, FILM COATED ORAL EVERY MORNING
Qty: 90 TABLET | Refills: 1 | Status: SHIPPED | OUTPATIENT
Start: 2021-12-27 | End: 2022-08-03 | Stop reason: SDUPTHER

## 2021-12-27 NOTE — TELEPHONE ENCOUNTER
Refill for     PARoxetine (PAXIL) 30 mg tablet    Sent to     [id number] - [place], [place] -  [address]  [phone]

## 2021-12-31 ENCOUNTER — OFFICE VISIT (OUTPATIENT)
Dept: URGENT CARE | Facility: CLINIC | Age: 21
End: 2021-12-31
Payer: COMMERCIAL

## 2021-12-31 VITALS
OXYGEN SATURATION: 94 % | WEIGHT: 180 LBS | BODY MASS INDEX: 25.2 KG/M2 | HEART RATE: 78 BPM | TEMPERATURE: 97.8 F | HEIGHT: 71 IN | RESPIRATION RATE: 18 BRPM

## 2021-12-31 DIAGNOSIS — J02.9 SORE THROAT: ICD-10-CM

## 2021-12-31 DIAGNOSIS — B34.9 VIRAL ILLNESS: Primary | ICD-10-CM

## 2021-12-31 LAB — S PYO AG THROAT QL: NEGATIVE

## 2021-12-31 PROCEDURE — 87070 CULTURE OTHR SPECIMN AEROBIC: CPT | Performed by: NURSE PRACTITIONER

## 2021-12-31 PROCEDURE — 99203 OFFICE O/P NEW LOW 30 MIN: CPT | Performed by: NURSE PRACTITIONER

## 2021-12-31 PROCEDURE — 87636 SARSCOV2 & INF A&B AMP PRB: CPT | Performed by: NURSE PRACTITIONER

## 2021-12-31 PROCEDURE — 87880 STREP A ASSAY W/OPTIC: CPT | Performed by: NURSE PRACTITIONER

## 2021-12-31 RX ORDER — ISOTRETINOIN 30 MG/1
30 CAPSULE ORAL 2 TIMES DAILY
COMMUNITY

## 2021-12-31 RX ORDER — PAROXETINE 30 MG/1
30 TABLET, FILM COATED ORAL EVERY MORNING
COMMUNITY

## 2022-01-03 LAB — BACTERIA THROAT CULT: NORMAL

## 2022-01-06 LAB
FLUAV RNA RESP QL NAA+PROBE: NEGATIVE
FLUBV RNA RESP QL NAA+PROBE: NEGATIVE
SARS-COV-2 RNA RESP QL NAA+PROBE: NEGATIVE

## 2022-08-03 DIAGNOSIS — F41.0 PANIC DISORDER: ICD-10-CM

## 2022-08-03 RX ORDER — PAROXETINE 30 MG/1
30 TABLET, FILM COATED ORAL EVERY MORNING
Qty: 90 TABLET | Refills: 1 | OUTPATIENT
Start: 2022-08-03 | End: 2023-01-30

## 2022-08-03 RX ORDER — PAROXETINE 30 MG/1
30 TABLET, FILM COATED ORAL EVERY MORNING
Qty: 30 TABLET | Refills: 0 | Status: SHIPPED | OUTPATIENT
Start: 2022-08-03 | End: 2022-08-17 | Stop reason: SDUPTHER

## 2022-08-03 NOTE — TELEPHONE ENCOUNTER
Do you have enough medication for the next 5 days?: yes  Did you request this medication through your pharmacy or our patient portal in the last day or two? no  Name of medication requested: paroxetine  Medication Strength: 30 mg  Mediation Directions: take 1 tablet by mouth every morning   Quantity Requested (example 30/90): 90  Number of refills requested:       System Generated Preferred Pharmacy(s)  are as follows.  If more than one pharmacy displays please identify which one should be used for this call    Has the pharmacy information below been confirmed with the patient?  Yes        [id number] - [place] - [address]  [address] [address]  Phone: [phone] Fax: [phone]    Next Encounter with this provider: Visit date not found

## 2022-08-17 ENCOUNTER — OFFICE VISIT (OUTPATIENT)
Dept: INTERNAL MEDICINE | Facility: CLINIC | Age: 22
End: 2022-08-17
Payer: COMMERCIAL

## 2022-08-17 VITALS
BODY MASS INDEX: 25.2 KG/M2 | HEART RATE: 86 BPM | WEIGHT: 180 LBS | OXYGEN SATURATION: 98 % | SYSTOLIC BLOOD PRESSURE: 120 MMHG | RESPIRATION RATE: 16 BRPM | HEIGHT: 71 IN | DIASTOLIC BLOOD PRESSURE: 82 MMHG | TEMPERATURE: 98.2 F

## 2022-08-17 DIAGNOSIS — F41.0 PANIC DISORDER: ICD-10-CM

## 2022-08-17 DIAGNOSIS — J45.40 MODERATE PERSISTENT ASTHMA WITHOUT COMPLICATION: ICD-10-CM

## 2022-08-17 DIAGNOSIS — Z23 IMMUNIZATION DUE: ICD-10-CM

## 2022-08-17 DIAGNOSIS — Z00.00 ENCOUNTER FOR GENERAL ADULT MEDICAL EXAMINATION WITHOUT ABNORMAL FINDINGS: Primary | ICD-10-CM

## 2022-08-17 DIAGNOSIS — Z20.2 EXPOSURE TO STD: ICD-10-CM

## 2022-08-17 DIAGNOSIS — K58.2 IRRITABLE BOWEL SYNDROME WITH BOTH CONSTIPATION AND DIARRHEA: ICD-10-CM

## 2022-08-17 PROCEDURE — 90471 IMMUNIZATION ADMIN: CPT | Performed by: NURSE PRACTITIONER

## 2022-08-17 PROCEDURE — 99395 PREV VISIT EST AGE 18-39: CPT | Mod: 25 | Performed by: NURSE PRACTITIONER

## 2022-08-17 PROCEDURE — 90715 TDAP VACCINE 7 YRS/> IM: CPT | Performed by: NURSE PRACTITIONER

## 2022-08-17 PROCEDURE — 3008F BODY MASS INDEX DOCD: CPT | Performed by: NURSE PRACTITIONER

## 2022-08-17 RX ORDER — PAROXETINE 30 MG/1
30 TABLET, FILM COATED ORAL EVERY MORNING
Qty: 90 TABLET | Refills: 1 | Status: SHIPPED | OUTPATIENT
Start: 2022-08-17 | End: 2022-12-04 | Stop reason: SDUPTHER

## 2022-08-17 ASSESSMENT — ENCOUNTER SYMPTOMS
SINUS PRESSURE: 0
EYE REDNESS: 0
DIARRHEA: 0
FATIGUE: 0
ABDOMINAL PAIN: 1
ARTHRALGIAS: 0
SHORTNESS OF BREATH: 0
CONSTIPATION: 1
COUGH: 0
HEADACHES: 0
PALPITATIONS: 0
SORE THROAT: 0
DIZZINESS: 0
NAUSEA: 0
FEVER: 0
EYE PAIN: 0
JOINT SWELLING: 0
CHILLS: 0
DYSURIA: 0
FLANK PAIN: 0
VOMITING: 0
DIAPHORESIS: 0
CHEST TIGHTNESS: 0

## 2022-08-17 NOTE — PROGRESS NOTES
Family Medicine  [facility] in [facility]         The following have been reviewed and updated as appropriate in this visit:    Subjective      Patient ID: Richard Kasper is a 22 y.o. male.    This patient presents for his annual physical. He also needs a refill of his paxil. He notes he has been feeling well. For the last 2 days he has noticed a cyst between his anus and tailbone that is painful. The swelling has gone down but there was some pustular discharge. He has not had anything like this before. He is sexually active, but does not receive anal sex. He saw GI last week for his IBS-C. He has a prescription for bentyl but he isn't sure that it helps. He is otherwise well.      Past Medical History:   Diagnosis Date   • Acne    • Asthma    • Moderate persistent asthma without complication 2/11/2020   • Panic disorder 2/11/2020   • Pneumothorax on left 3/15/2019       Past Surgical History:   Procedure Laterality Date   • DENTAL SURGERY      wisdom teeth removed       Current Outpatient Medications   Medication Sig Dispense Refill   • dicyclomine (BENTYL) 20 mg tablet Take 1 tablet (20 mg total) by mouth 4 (four) times a day as needed (diarrhea/irritable bowels). 60 tablet 0   • fexofenadine (ALLEGRA ALLERGY) 60 mg tablet Take by mouth 2 (two) times a day as needed.     • PARoxetine (PAXIL) 30 mg tablet Take 1 tablet (30 mg total) by mouth every morning. 90 tablet 1     No current facility-administered medications for this visit.       No Known Allergies    Social History     Socioeconomic History   • Marital status: Single     Spouse name: Not on file   • Number of children: Not on file   • Years of education: Not on file   • Highest education level: Not on file   Occupational History   • Not on file   Tobacco Use   • Smoking status: Never Smoker   • Smokeless tobacco: Never Used   Vaping Use   • Vaping Use: Never used   Substance and Sexual Activity   • Alcohol use: Yes   • Drug use: No  "  • Sexual activity: Defer   Other Topics Concern   • Not on file   Social History Narrative   • Not on file     Social Determinants of Health     Financial Resource Strain: Not on file   Food Insecurity: Not on file   Transportation Needs: Not on file   Physical Activity: Not on file   Stress: Not on file   Social Connections: Not on file   Intimate Partner Violence: Not on file   Housing Stability: Not on file       Family History   Problem Relation Age of Onset   • Hypothyroidism Biological Mother    • Hyperlipidemia Biological Father    • Diverticulosis Biological Father    • Hyperlipidemia Biological Sister    • Hypothyroidism Maternal Grandfather    • Stomach cancer Maternal Grandfather          The following have been reviewed and updated as appropriate in this visit:   Allergies  Meds  Problems           Review of Systems   Constitutional: Negative for chills, diaphoresis, fatigue and fever.   HENT: Negative for congestion, ear pain, sinus pressure and sore throat.    Eyes: Negative for pain and redness.   Respiratory: Negative for cough, chest tightness and shortness of breath.    Cardiovascular: Negative for chest pain and palpitations.   Gastrointestinal: Positive for abdominal pain (intermittent) and constipation. Negative for diarrhea, nausea and vomiting.   Genitourinary: Negative for dysuria, flank pain and penile discharge.   Musculoskeletal: Negative for arthralgias and joint swelling.   Skin: Negative for rash.        Possible cyst near rectum   Neurological: Negative for dizziness and headaches.         Objective     Visit Vitals  /82 (BP Location: Left upper arm, Patient Position: Sitting)   Pulse 86   Temp 36.8 °C (98.2 °F) (Temporal)   Resp 16   Ht 1.803 m (5' 11\")   Wt 81.6 kg (180 lb)   SpO2 98%   BMI 25.10 kg/m²       No exam data present    Physical Exam  Constitutional:       General: He is not in acute distress.  HENT:      Right Ear: Tympanic membrane, ear canal and external ear " normal.      Left Ear: Tympanic membrane, ear canal and external ear normal.      Nose: Nose normal. No congestion or rhinorrhea.      Mouth/Throat:      Pharynx: No oropharyngeal exudate or posterior oropharyngeal erythema.   Eyes:      General: No scleral icterus.        Right eye: No discharge.         Left eye: No discharge.   Cardiovascular:      Rate and Rhythm: Normal rate and regular rhythm.      Heart sounds: Normal heart sounds. No murmur heard.  Pulmonary:      Effort: No respiratory distress.      Breath sounds: Normal breath sounds. No stridor. No wheezing, rhonchi or rales.   Abdominal:      General: Bowel sounds are normal. There is no distension.      Palpations: There is no mass.      Tenderness: There is no abdominal tenderness. There is no guarding.   Genitourinary:     Comments: No cyst noted on exam. No drainage. No redness to skin in the area.     exam chaperoned by Kerri Long MA.  Musculoskeletal:         General: No swelling.      Cervical back: Normal range of motion and neck supple.      Right lower leg: No edema.      Left lower leg: No edema.   Lymphadenopathy:      Cervical: No cervical adenopathy.   Skin:     General: Skin is warm and dry.      Findings: No rash.   Neurological:      Mental Status: He is alert and oriented to person, place, and time.   Psychiatric:         Mood and Affect: Mood normal.           Assessment/Plan   Problem List Items Addressed This Visit        Respiratory    Moderate persistent asthma without complication     Stable. Monitor.              Digestive    Irritable bowel syndrome with both constipation and diarrhea     Stable. Follow up with GI.              Other    Panic disorder     Stable. Continue current medication.            Relevant Medications    PARoxetine (PAXIL) 30 mg tablet    Encounter for general adult medical examination without abnormal findings - Primary     Routine health maintenance discussed. Due for Tdap. Provided today. Check  routine labs. Check STI screening. No cyst noted in the rectal area on exam. Monitor. Return to the office if symptoms worsen.           Relevant Orders    CBC and Differential    Comprehensive metabolic panel    Lipid Prof w Refl    TSH w reflex FT4      Other Visit Diagnoses     Exposure to STD        Relevant Orders    Chlamydia/GC RNA:ThinPrep/Urine/Swab    Hepatitis C antibody    HERPES SIMPLEX VIRUS 1&2 (IGG), WITH REFLEX TO HSV2    HIV 1,2 AB P24 AG    Syphilis Antibodies    Immunization due        Relevant Orders    Tdap vaccine greater than or equal to 6yo IM (Completed)          Return in about 1 year (around 8/17/2023) for Routine Physical Exam.       Written education and action steps suggested to the patient are documented in After Visit Summary / Patient Instructions sections of this encounter.        YOEL Carmona

## 2022-08-17 NOTE — ASSESSMENT & PLAN NOTE
Routine health maintenance discussed. Due for Tdap. Provided today. Check routine labs. Check STI screening. No cyst noted in the rectal area on exam. Monitor. Return to the office if symptoms worsen.

## 2022-08-23 LAB
ALBUMIN SERPL-MCNC: 4.8 G/DL (ref 4.1–5.2)
ALBUMIN/GLOB SERPL: 1.9 {RATIO} (ref 1.2–2.2)
ALP SERPL-CCNC: 67 IU/L (ref 44–121)
ALT SERPL-CCNC: 28 IU/L (ref 0–44)
AST SERPL-CCNC: 30 IU/L (ref 0–40)
BASOPHILS # BLD AUTO: 0.1 X10E3/UL (ref 0–0.2)
BASOPHILS NFR BLD AUTO: 1 %
BILIRUB SERPL-MCNC: 0.6 MG/DL (ref 0–1.2)
BUN SERPL-MCNC: 15 MG/DL (ref 6–20)
BUN/CREAT SERPL: 16 (ref 9–20)
C TRACH RRNA SPEC QL NAA+PROBE: NEGATIVE
CALCIUM SERPL-MCNC: 10.3 MG/DL (ref 8.7–10.2)
CHLORIDE SERPL-SCNC: 102 MMOL/L (ref 96–106)
CHOLEST SERPL-MCNC: 183 MG/DL (ref 100–199)
CO2 SERPL-SCNC: 24 MMOL/L (ref 20–29)
CREAT SERPL-MCNC: 0.91 MG/DL (ref 0.76–1.27)
EGFRCR-CYS SERPLBLD CKD-EPI 2021: 122 ML/MIN/1.73
EOSINOPHIL # BLD AUTO: 0.1 X10E3/UL (ref 0–0.4)
EOSINOPHIL NFR BLD AUTO: 3 %
ERYTHROCYTE [DISTWIDTH] IN BLOOD BY AUTOMATED COUNT: 12.3 % (ref 11.6–15.4)
GLOBULIN SER CALC-MCNC: 2.5 G/DL (ref 1.5–4.5)
GLUCOSE SERPL-MCNC: 78 MG/DL (ref 65–99)
HCT VFR BLD AUTO: 45 % (ref 37.5–51)
HCV AB S/CO SERPL IA: <0.1 S/CO RATIO (ref 0–0.9)
HDLC SERPL-MCNC: 39 MG/DL
HGB BLD-MCNC: 15.3 G/DL (ref 13–17.7)
HIV 1+2 AB+HIV1 P24 AG SERPL QL IA: NON REACTIVE
HSV1 IGG SER IA-ACNC: 0.96 INDEX (ref 0–0.9)
HSV2 IGG SER IA-ACNC: <0.91 INDEX (ref 0–0.9)
IMM GRANULOCYTES # BLD AUTO: 0 X10E3/UL (ref 0–0.1)
IMM GRANULOCYTES NFR BLD AUTO: 1 %
LDLC SERPL CALC-MCNC: 130 MG/DL (ref 0–99)
LDLC/HDLC SERPL: 3.3 RATIO (ref 0–3.6)
LYMPHOCYTES # BLD AUTO: 1.5 X10E3/UL (ref 0.7–3.1)
LYMPHOCYTES NFR BLD AUTO: 36 %
MCH RBC QN AUTO: 29.9 PG (ref 26.6–33)
MCHC RBC AUTO-ENTMCNC: 34 G/DL (ref 31.5–35.7)
MCV RBC AUTO: 88 FL (ref 79–97)
MONOCYTES # BLD AUTO: 0.4 X10E3/UL (ref 0.1–0.9)
MONOCYTES NFR BLD AUTO: 9 %
N GONORRHOEA RRNA SPEC QL NAA+PROBE: NEGATIVE
NEUTROPHILS # BLD AUTO: 2 X10E3/UL (ref 1.4–7)
NEUTROPHILS NFR BLD AUTO: 50 %
PLATELET # BLD AUTO: 253 X10E3/UL (ref 150–450)
POTASSIUM SERPL-SCNC: 4.9 MMOL/L (ref 3.5–5.2)
PROT SERPL-MCNC: 7.3 G/DL (ref 6–8.5)
RBC # BLD AUTO: 5.12 X10E6/UL (ref 4.14–5.8)
SODIUM SERPL-SCNC: 141 MMOL/L (ref 134–144)
T4 FREE SERPL-MCNC: 1.41 NG/DL (ref 0.82–1.77)
TREPONEMA PALLIDUM IGG+IGM AB [PRESENCE] IN SERUM OR PLASMA BY IMMUNOASSAY: NON REACTIVE
TRIGL SERPL-MCNC: 73 MG/DL (ref 0–149)
TSH SERPL DL<=0.005 MIU/L-ACNC: 1.35 UIU/ML (ref 0.45–4.5)
VLDLC SERPL CALC-MCNC: 14 MG/DL (ref 5–40)
WBC # BLD AUTO: 4 X10E3/UL (ref 3.4–10.8)

## 2022-08-24 NOTE — RESULT ENCOUNTER NOTE
Call patient. Let him know that his blood counts, metabolic panel, cholesterol, and thyroid function are stable. 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 If he develops any cold sores he should be seen for treatment. Follow up in 1 year.

## 2022-12-04 DIAGNOSIS — F41.0 PANIC DISORDER: ICD-10-CM

## 2022-12-05 RX ORDER — PAROXETINE 30 MG/1
30 TABLET, FILM COATED ORAL EVERY MORNING
Qty: 90 TABLET | Refills: 2 | Status: SHIPPED | OUTPATIENT
Start: 2022-12-05 | End: 2022-12-06 | Stop reason: SDUPTHER

## 2022-12-06 DIAGNOSIS — F41.0 PANIC DISORDER: ICD-10-CM

## 2022-12-07 RX ORDER — PAROXETINE 30 MG/1
30 TABLET, FILM COATED ORAL EVERY MORNING
Qty: 90 TABLET | Refills: 2 | Status: SHIPPED | OUTPATIENT
Start: 2022-12-07 | End: 2023-03-19

## 2023-03-19 ENCOUNTER — HOSPITAL ENCOUNTER (OUTPATIENT)
Facility: CLINIC | Age: 23
Discharge: HOME | End: 2023-03-19
Attending: FAMILY MEDICINE
Payer: COMMERCIAL

## 2023-03-19 VITALS
DIASTOLIC BLOOD PRESSURE: 78 MMHG | HEART RATE: 87 BPM | WEIGHT: 175 LBS | HEIGHT: 71 IN | TEMPERATURE: 97.7 F | SYSTOLIC BLOOD PRESSURE: 121 MMHG | BODY MASS INDEX: 24.5 KG/M2 | OXYGEN SATURATION: 98 %

## 2023-03-19 DIAGNOSIS — J01.10 ACUTE NON-RECURRENT FRONTAL SINUSITIS: Primary | ICD-10-CM

## 2023-03-19 PROCEDURE — 99213 OFFICE O/P EST LOW 20 MIN: CPT | Performed by: FAMILY MEDICINE

## 2023-03-19 PROCEDURE — S9083 URGENT CARE CENTER GLOBAL: HCPCS | Performed by: FAMILY MEDICINE

## 2023-03-19 RX ORDER — AMOXICILLIN AND CLAVULANATE POTASSIUM 875; 125 MG/1; MG/1
1 TABLET, FILM COATED ORAL
Qty: 14 TABLET | Refills: 0 | Status: SHIPPED | OUTPATIENT
Start: 2023-03-19 | End: 2023-03-26

## 2023-03-19 ASSESSMENT — ENCOUNTER SYMPTOMS
VOMITING: 0
HEADACHES: 1
CHILLS: 0
FEVER: 0
SHORTNESS OF BREATH: 0
ABDOMINAL PAIN: 0
NAUSEA: 0

## 2023-03-19 NOTE — ED PROVIDER NOTES
History  Chief Complaint   Patient presents with   • Sinusitis     Nasal congestion symptoms present for 1 month        History provided by:  Patient   used: No    Sinusitis  Pain details:     Location:  Frontal    Quality:  Pressure    Severity:  Moderate    Duration:  4 weeks    Timing:  Constant  Duration:  4 weeks  Progression:  Worsening (worsened past 2 days)  Chronicity:  New  Associated symptoms: headaches (worse with bending forward)    Associated symptoms: no chest pain, no chills, no fever, no nausea, no shortness of breath and no vomiting        Past Medical History:   Diagnosis Date   • Acne    • Asthma    • Moderate persistent asthma without complication 2/11/2020   • Panic disorder 2/11/2020   • Pneumothorax on left 3/15/2019       Past Surgical History:   Procedure Laterality Date   • DENTAL SURGERY      wisdom teeth removed       Family History   Problem Relation Age of Onset   • Hypothyroidism Biological Mother    • Hyperlipidemia Biological Father    • Diverticulosis Biological Father    • Hyperlipidemia Biological Sister    • Hypothyroidism Maternal Grandfather    • Stomach cancer Maternal Grandfather        Social History     Tobacco Use   • Smoking status: Never   • Smokeless tobacco: Never   Vaping Use   • Vaping status: Never Used   Substance Use Topics   • Alcohol use: Yes   • Drug use: No       Review of Systems   Constitutional: Negative for chills and fever.   Respiratory: Negative for shortness of breath.    Cardiovascular: Negative for chest pain.   Gastrointestinal: Negative for abdominal pain, nausea and vomiting.   Neurological: Positive for headaches (worse with bending forward).       Physical Exam  ED Triage Vitals [03/19/23 1304]   Temp Heart Rate Resp BP SpO2   36.5 °C (97.7 °F) 87 -- 121/78 98 %      Temp src Heart Rate Source Patient Position BP Location FiO2 (%) (Set)   -- -- -- -- --       Physical Exam  Vitals and nursing note reviewed.   Constitutional:        General: He is not in acute distress.     Appearance: Normal appearance. He is well-developed. He is not ill-appearing.   HENT:      Head: Normocephalic and atraumatic.      Right Ear: Tympanic membrane, ear canal and external ear normal.      Left Ear: Tympanic membrane, ear canal and external ear normal.      Nose: No mucosal edema or congestion.      Mouth/Throat:      Pharynx: No posterior oropharyngeal erythema.      Tonsils: No tonsillar exudate.      Comments: + PND  Eyes:      Conjunctiva/sclera: Conjunctivae normal.   Cardiovascular:      Rate and Rhythm: Normal rate and regular rhythm.   Pulmonary:      Effort: Pulmonary effort is normal. No tachypnea or respiratory distress.      Breath sounds: Normal breath sounds. No decreased breath sounds, wheezing, rhonchi or rales.   Musculoskeletal:      Cervical back: Neck supple.   Lymphadenopathy:      Cervical: Cervical adenopathy present.      Right cervical: Superficial cervical adenopathy present.      Left cervical: Superficial cervical adenopathy present.   Neurological:      Mental Status: He is alert.   Psychiatric:         Attention and Perception: Attention normal.         Mood and Affect: Mood normal.         Speech: Speech normal.         Behavior: Behavior normal. Behavior is cooperative.         Thought Content: Thought content normal.         Judgment: Judgment normal.           Procedures  Procedures    UC Course       Medical Decision Making  Symptoms and exam are most consistent with sinusitis.   Onset: 1 month ago but worsened last 2 days  Treatment:  augmentin  Follow up immediately if symptoms worsen at any time.   Otherwise, continue follow up with pcp                   Sara Alvarado MD  03/19/23 2838

## 2023-06-28 ENCOUNTER — OFFICE VISIT (OUTPATIENT)
Dept: INTERNAL MEDICINE | Facility: CLINIC | Age: 23
End: 2023-06-28
Payer: COMMERCIAL

## 2023-06-28 VITALS
WEIGHT: 184 LBS | RESPIRATION RATE: 16 BRPM | DIASTOLIC BLOOD PRESSURE: 64 MMHG | BODY MASS INDEX: 25.66 KG/M2 | HEART RATE: 65 BPM | SYSTOLIC BLOOD PRESSURE: 110 MMHG | OXYGEN SATURATION: 98 % | TEMPERATURE: 97.5 F

## 2023-06-28 DIAGNOSIS — J45.40 MODERATE PERSISTENT ASTHMA WITHOUT COMPLICATION: ICD-10-CM

## 2023-06-28 DIAGNOSIS — F98.8 ATTENTION DEFICIT DISORDER, UNSPECIFIED HYPERACTIVITY PRESENCE: Primary | ICD-10-CM

## 2023-06-28 PROCEDURE — 3008F BODY MASS INDEX DOCD: CPT | Performed by: NURSE PRACTITIONER

## 2023-06-28 PROCEDURE — 99213 OFFICE O/P EST LOW 20 MIN: CPT | Performed by: NURSE PRACTITIONER

## 2023-06-28 RX ORDER — DEXTROAMPHETAMINE SACCHARATE, AMPHETAMINE ASPARTATE MONOHYDRATE, DEXTROAMPHETAMINE SULFATE AND AMPHETAMINE SULFATE 2.5; 2.5; 2.5; 2.5 MG/1; MG/1; MG/1; MG/1
10 CAPSULE, EXTENDED RELEASE ORAL EVERY MORNING
Qty: 30 CAPSULE | Refills: 0 | Status: SHIPPED | OUTPATIENT
Start: 2023-06-28 | End: 2023-07-26

## 2023-06-28 ASSESSMENT — PATIENT HEALTH QUESTIONNAIRE - PHQ9: SUM OF ALL RESPONSES TO PHQ9 QUESTIONS 1 & 2: 0

## 2023-06-28 ASSESSMENT — ENCOUNTER SYMPTOMS
DECREASED CONCENTRATION: 1
SHORTNESS OF BREATH: 0
CHEST TIGHTNESS: 0
PALPITATIONS: 0
NERVOUS/ANXIOUS: 1

## 2023-06-28 NOTE — PROGRESS NOTES
Family Medicine  [facility] Internal Medicine in [facility]         The following have been reviewed and updated as appropriate in this visit:    Subjective      Patient ID: Richard Kasper is a 23 y.o. male.    This patient presents to discuss treatment for ADHD. He was on medication when he was in high school for a couple of weeks. He can't remember what he took, but it may have been adderall. He seems to remember his heart racing but he also wasn't interested in taking medication at that time so he didn't really give it a chance. He thinks he saw a psychiatrist for evaluation and diagnosis but then his pediatrician prescribed the medication. He has been having trouble focusing on simple tasks and has been feeling overwhelmed with the amount of work each day. He will be done with school very soon and then will be working on getting the certifications he needs to get a job in his field. His degree is in [email or web address]. He almost always feels overwhelmed. He tends to put things off until the last minute. He is concerned he will not be able to focus properly to succeed in getting his certifications and ultimately a job. He does have a history of anxiety and was previously prescribed paroxetine. He has not been taking this recently.      Past Medical History:   Diagnosis Date   • Acne    • Asthma    • Moderate persistent asthma without complication 2/11/2020   • Panic disorder 2/11/2020   • Pneumothorax on left 3/15/2019       Past Surgical History:   Procedure Laterality Date   • DENTAL SURGERY      wisdom teeth removed       Current Outpatient Medications   Medication Sig Dispense Refill   • amphetamine-dextroamphetamine XR (ADDERALL XR) 10 mg 24 hr capsule Take 1 capsule (10 mg total) by mouth every morning. 30 capsule 0     No current facility-administered medications for this visit.       No Known Allergies    Social History     Socioeconomic History   • Marital status: Single     Spouse name: Not on file   •  Number of children: Not on file   • Years of education: Not on file   • Highest education level: Not on file   Occupational History   • Not on file   Tobacco Use   • Smoking status: Never   • Smokeless tobacco: Never   Vaping Use   • Vaping Use: Never used   Substance and Sexual Activity   • Alcohol use: Yes   • Drug use: No   • Sexual activity: Defer   Other Topics Concern   • Not on file   Social History Narrative   • Not on file     Social Determinants of Health     Financial Resource Strain: Not on file   Food Insecurity: Not on file   Transportation Needs: Not on file   Physical Activity: Not on file   Stress: Not on file   Social Connections: Not on file   Intimate Partner Violence: Not on file   Housing Stability: Not on file       Family History   Problem Relation Age of Onset   • Hypothyroidism Biological Mother    • Hyperlipidemia Biological Father    • Diverticulosis Biological Father    • Hyperlipidemia Biological Sister    • Hypothyroidism Maternal Grandfather    • Stomach cancer Maternal Grandfather          The following have been reviewed and updated as appropriate in this visit:   Allergies  Meds  Problems         Review of Systems   Respiratory: Negative for chest tightness and shortness of breath.    Cardiovascular: Negative for chest pain and palpitations.   Psychiatric/Behavioral: Positive for decreased concentration. The patient is nervous/anxious.          Objective     Visit Vitals  /64   Pulse 65   Temp 36.4 °C (97.5 °F)   Resp 16   Wt 83.5 kg (184 lb)   SpO2 98%   BMI 25.66 kg/m²       No results found.    Physical Exam  Constitutional:       General: He is not in acute distress.  Eyes:      General: No scleral icterus.        Right eye: No discharge.         Left eye: No discharge.   Cardiovascular:      Rate and Rhythm: Normal rate and regular rhythm.      Heart sounds: Normal heart sounds. No murmur heard.  Pulmonary:      Effort: No respiratory distress.      Breath sounds:  Normal breath sounds. No stridor. No wheezing, rhonchi or rales.   Musculoskeletal:         General: No swelling.      Cervical back: Normal range of motion and neck supple.      Right lower leg: No edema.      Left lower leg: No edema.   Lymphadenopathy:      Cervical: No cervical adenopathy.   Skin:     General: Skin is warm and dry.      Findings: No rash.   Neurological:      Mental Status: He is alert and oriented to person, place, and time.   Psychiatric:         Mood and Affect: Mood normal.           Assessment/Plan   Problem List Items Addressed This Visit        Respiratory    Moderate persistent asthma without complication     Stable. Monitor. He does not have and has not needed an albuterol inhaler in many years.            Mental Health    Attention deficit disorder - Primary     Condition has worsened. Discussed options which include stimulants, strattera, or focusing on anxiety medications. Patient would like to try something like adderall again. Will trial 10 mg adderall daily. PDMP query satisfactory. Medication sent to pharmacy. Patient will return in 4 weeks to discuss efficacy. Reviewed there are many options for treatment, but that many medications are currently on back order. If he is not able to find this dose of medication he will let me know.         Relevant Medications    amphetamine-dextroamphetamine XR (ADDERALL XR) 10 mg 24 hr capsule       Return in about 4 weeks (around 7/26/2023) for medication check.       Written education and action steps suggested to the patient are documented in After Visit Summary / Patient Instructions sections of this encounter.        YOEL Carmona

## 2023-06-28 NOTE — ASSESSMENT & PLAN NOTE
Condition has worsened. Discussed options which include stimulants, strattera, or focusing on anxiety medications. Patient would like to try something like adderall again. Will trial 10 mg adderall daily. PDMP query satisfactory. Medication sent to pharmacy. Patient will return in 4 weeks to discuss efficacy. Reviewed there are many options for treatment, but that many medications are currently on back order. If he is not able to find this dose of medication he will let me know.

## 2023-07-26 ENCOUNTER — OFFICE VISIT (OUTPATIENT)
Dept: INTERNAL MEDICINE | Facility: CLINIC | Age: 23
End: 2023-07-26
Payer: COMMERCIAL

## 2023-07-26 VITALS
HEART RATE: 76 BPM | RESPIRATION RATE: 16 BRPM | WEIGHT: 181 LBS | SYSTOLIC BLOOD PRESSURE: 118 MMHG | BODY MASS INDEX: 25.24 KG/M2 | TEMPERATURE: 97.8 F | OXYGEN SATURATION: 98 % | DIASTOLIC BLOOD PRESSURE: 78 MMHG

## 2023-07-26 DIAGNOSIS — F98.8 ATTENTION DEFICIT DISORDER, UNSPECIFIED HYPERACTIVITY PRESENCE: Primary | ICD-10-CM

## 2023-07-26 PROCEDURE — 3008F BODY MASS INDEX DOCD: CPT | Performed by: NURSE PRACTITIONER

## 2023-07-26 PROCEDURE — 99213 OFFICE O/P EST LOW 20 MIN: CPT | Performed by: NURSE PRACTITIONER

## 2023-07-26 RX ORDER — DEXTROAMPHETAMINE SACCHARATE, AMPHETAMINE ASPARTATE, DEXTROAMPHETAMINE SULFATE AND AMPHETAMINE SULFATE 1.25; 1.25; 1.25; 1.25 MG/1; MG/1; MG/1; MG/1
5 TABLET ORAL 2 TIMES DAILY
Qty: 60 TABLET | Refills: 0 | Status: SHIPPED | OUTPATIENT
Start: 2023-07-26 | End: 2023-08-18 | Stop reason: SDUPTHER

## 2023-07-26 ASSESSMENT — ENCOUNTER SYMPTOMS
HEADACHES: 0
FEVER: 0
CHEST TIGHTNESS: 0
SHORTNESS OF BREATH: 0
DECREASED CONCENTRATION: 1
CHILLS: 0
DIAPHORESIS: 0

## 2023-07-26 NOTE — PROGRESS NOTES
Family Medicine  [facility] Internal Medicine in [facility]         The following have been reviewed and updated as appropriate in this visit:    Subjective      Patient ID: Richard Kasper is a 23 y.o. male.    This patient presents for a follow up appointment. He was started on 10 mg adderall about 1 month ago but was not able to  the prescription due to the current adderall shortage. His pharmacy was not able to let him know where he might be able to find the medication so he waited until today. He continues to have trouble focusing on simple tasks and has been feeling overwhelmed with the amount of work each day. He is done with school and has started working on getting the certifications he needs to get a job in his field. He feels that he is not retaining the material that he is studying.       Past Medical History:   Diagnosis Date   • Acne    • Asthma    • Moderate persistent asthma without complication 2/11/2020   • Panic disorder 2/11/2020   • Pneumothorax on left 3/15/2019       Past Surgical History:   Procedure Laterality Date   • DENTAL SURGERY      wisdom teeth removed       Current Outpatient Medications   Medication Sig Dispense Refill   • dextroamphetamine-amphetamine (AdderalL) 5 mg tablet Take 1 tablet (5 mg total) by mouth 2 (two) times a day. 60 tablet 0     No current facility-administered medications for this visit.       No Known Allergies    Social History     Socioeconomic History   • Marital status: Single     Spouse name: Not on file   • Number of children: Not on file   • Years of education: Not on file   • Highest education level: Not on file   Occupational History   • Not on file   Tobacco Use   • Smoking status: Never   • Smokeless tobacco: Never   Vaping Use   • Vaping Use: Never used   Substance and Sexual Activity   • Alcohol use: Yes   • Drug use: No   • Sexual activity: Defer   Other Topics Concern   • Not on file   Social History Narrative   • Not on file     Social  Determinants of Health     Financial Resource Strain: Not on file   Food Insecurity: Not on file   Transportation Needs: Not on file   Physical Activity: Not on file   Stress: Not on file   Social Connections: Not on file   Intimate Partner Violence: Not on file   Housing Stability: Not on file       Family History   Problem Relation Age of Onset   • Hypothyroidism Biological Mother    • Hyperlipidemia Biological Father    • Diverticulosis Biological Father    • Hyperlipidemia Biological Sister    • Hypothyroidism Maternal Grandfather    • Stomach cancer Maternal Grandfather          The following have been reviewed and updated as appropriate in this visit:   Allergies  Meds  Problems         Review of Systems   Constitutional: Negative for chills, diaphoresis and fever.   Respiratory: Negative for chest tightness and shortness of breath.    Cardiovascular: Negative for chest pain.   Neurological: Negative for headaches.   Psychiatric/Behavioral: Positive for decreased concentration.         Objective     Visit Vitals  /78   Pulse 76   Temp 36.6 °C (97.8 °F)   Resp 16   Wt 82.1 kg (181 lb)   SpO2 98%   BMI 25.24 kg/m²       No results found.    Physical Exam  Constitutional:       General: He is not in acute distress.  Eyes:      General: No scleral icterus.        Right eye: No discharge.         Left eye: No discharge.   Cardiovascular:      Rate and Rhythm: Normal rate and regular rhythm.      Heart sounds: Normal heart sounds. No murmur heard.  Pulmonary:      Effort: No respiratory distress.      Breath sounds: Normal breath sounds. No stridor. No wheezing, rhonchi or rales.   Musculoskeletal:         General: No swelling.      Cervical back: Normal range of motion and neck supple.      Right lower leg: No edema.      Left lower leg: No edema.   Lymphadenopathy:      Cervical: No cervical adenopathy.   Skin:     General: Skin is warm and dry.      Findings: No rash.   Neurological:      Mental Status:  He is alert and oriented to person, place, and time.   Psychiatric:         Mood and Affect: Mood normal.           Assessment/Plan   Problem List Items Addressed This Visit        Mental Health    Attention deficit disorder - Primary     Unable to locate 10 mg adderall XR at his pharmacy. Will try 5 mg of adderall IR twice daily. He has a follow up with Dr. Laguerre in August and will discuss efficacy with him prior to additional refills.         Relevant Medications    dextroamphetamine-amphetamine (AdderalL) 5 mg tablet       Return for Next scheduled follow-up.       Written education and action steps suggested to the patient are documented in After Visit Summary / Patient Instructions sections of this encounter.        YOEL Carmona

## 2023-07-26 NOTE — ASSESSMENT & PLAN NOTE
Unable to locate 10 mg adderall XR at his pharmacy. Will try 5 mg of adderall IR twice daily. He has a follow up with Dr. Laguerre in August and will discuss efficacy with him prior to additional refills.

## 2023-08-16 PROBLEM — R00.0 TACHYCARDIA: Status: RESOLVED | Noted: 2020-02-11 | Resolved: 2023-08-16

## 2023-08-16 PROBLEM — L70.9 ACNE: Status: RESOLVED | Noted: 2020-02-11 | Resolved: 2023-08-16

## 2023-08-16 PROBLEM — Z00.00 ENCOUNTER FOR GENERAL ADULT MEDICAL EXAMINATION WITHOUT ABNORMAL FINDINGS: Status: RESOLVED | Noted: 2022-08-17 | Resolved: 2023-08-16

## 2023-08-16 PROBLEM — J45.20 MILD INTERMITTENT ASTHMA WITHOUT COMPLICATION: Status: RESOLVED | Noted: 2020-02-11 | Resolved: 2023-08-16

## 2023-08-16 PROBLEM — J45.20 MILD INTERMITTENT ASTHMA WITHOUT COMPLICATION: Status: ACTIVE | Noted: 2020-02-11

## 2023-08-18 ENCOUNTER — OFFICE VISIT (OUTPATIENT)
Dept: INTERNAL MEDICINE | Facility: CLINIC | Age: 23
End: 2023-08-18
Payer: COMMERCIAL

## 2023-08-18 VITALS
OXYGEN SATURATION: 98 % | WEIGHT: 178 LBS | DIASTOLIC BLOOD PRESSURE: 66 MMHG | HEIGHT: 71 IN | RESPIRATION RATE: 16 BRPM | HEART RATE: 76 BPM | TEMPERATURE: 98.2 F | SYSTOLIC BLOOD PRESSURE: 112 MMHG | BODY MASS INDEX: 24.92 KG/M2

## 2023-08-18 DIAGNOSIS — K58.2 IRRITABLE BOWEL SYNDROME WITH BOTH CONSTIPATION AND DIARRHEA: ICD-10-CM

## 2023-08-18 DIAGNOSIS — Z00.00 WELL ADULT EXAM: Primary | ICD-10-CM

## 2023-08-18 DIAGNOSIS — Z20.2 EXPOSURE TO STD: ICD-10-CM

## 2023-08-18 DIAGNOSIS — F98.8 ATTENTION DEFICIT DISORDER, UNSPECIFIED HYPERACTIVITY PRESENCE: ICD-10-CM

## 2023-08-18 DIAGNOSIS — F41.0 PANIC DISORDER: ICD-10-CM

## 2023-08-18 PROCEDURE — 3008F BODY MASS INDEX DOCD: CPT | Performed by: FAMILY MEDICINE

## 2023-08-18 PROCEDURE — 99395 PREV VISIT EST AGE 18-39: CPT | Performed by: FAMILY MEDICINE

## 2023-08-18 RX ORDER — DEXTROAMPHETAMINE SACCHARATE, AMPHETAMINE ASPARTATE, DEXTROAMPHETAMINE SULFATE AND AMPHETAMINE SULFATE 2.5; 2.5; 2.5; 2.5 MG/1; MG/1; MG/1; MG/1
10 TABLET ORAL 2 TIMES DAILY
Qty: 60 TABLET | Refills: 0 | Status: SHIPPED | OUTPATIENT
Start: 2023-08-18 | End: 2024-03-07

## 2023-08-18 ASSESSMENT — ENCOUNTER SYMPTOMS
NAUSEA: 0
SLEEP DISTURBANCE: 0
HYPERACTIVE: 0
ABDOMINAL PAIN: 0
ARTHRALGIAS: 0
FEVER: 0
DECREASED CONCENTRATION: 1
BACK PAIN: 0
CHEST TIGHTNESS: 0
COUGH: 0
VOMITING: 0
MYALGIAS: 0
SHORTNESS OF BREATH: 0
JOINT SWELLING: 0
NERVOUS/ANXIOUS: 1
CHILLS: 0
WHEEZING: 0
CONSTIPATION: 0
DYSPHORIC MOOD: 0
SORE THROAT: 0
PALPITATIONS: 0
DIARRHEA: 0
FATIGUE: 0

## 2023-08-18 NOTE — ASSESSMENT & PLAN NOTE
Stable.  We will increase dose to 10 mg twice a day.  He will let me know if he has any issues in terms of supply at the pharmacies or if the dose is not working for him.  PDMP query satisfactory.

## 2023-08-18 NOTE — PROGRESS NOTES
Family Medicine  [facility] Internal Medicine at [facility]         The following have been reviewed and updated as appropriate in this visit:    Patient ID: Zlealem Kasper is a 23 y.o. male.    Subjective     Here for a routine well visit.  He notes that the Adderall seems to be helping where he feels like he could use a higher dose.  The only downside feels like he has had some erectile dysfunction.  He is not sure if that is just psychological or not at this point.  He also would like STD testing again.      Past Medical History:   Diagnosis Date   • Acne    • Asthma    • Attention deficit disorder 6/28/2023   • Panic disorder 02/11/2020   • Pneumothorax on left 03/15/2019       Past Surgical History:   Procedure Laterality Date   • DENTAL SURGERY      wisdom teeth removed       Current Outpatient Medications   Medication Sig Dispense Refill   • dextroamphetamine-amphetamine (ADDERALL) 10 mg tablet Take 1 tablet (10 mg total) by mouth 2 (two) times a day. 60 tablet 0     No current facility-administered medications for this visit.       No Known Allergies    Social History     Socioeconomic History   • Marital status: Single     Spouse name: Not on file   • Number of children: Not on file   • Years of education: Not on file   • Highest education level: Not on file   Occupational History   • Not on file   Tobacco Use   • Smoking status: Never   • Smokeless tobacco: Never   Vaping Use   • Vaping Use: Some days   • Substances: Nicotine   • Devices: Disposable   Substance and Sexual Activity   • Alcohol use: Yes     Comment: 1 day wk   • Drug use: No   • Sexual activity: Defer   Other Topics Concern   • Not on file   Social History Narrative   • Not on file     Social Determinants of Health     Financial Resource Strain: Not on file   Food Insecurity: Not on file   Transportation Needs: Not on file   Physical Activity: Not on file   Stress: Not on file   Social Connections: Not on file   Intimate Partner Violence:  "Not on file   Housing Stability: Not on file       Family History   Problem Relation Age of Onset   • Hypothyroidism Biological Mother    • Hyperlipidemia Biological Father    • Diverticulosis Biological Father    • Hyperlipidemia Biological Sister    • Hypothyroidism Maternal Grandfather    • Stomach cancer Maternal Grandfather          The following have been reviewed and updated as appropriate in this visit:   Allergies  Meds  Problems         Review of Systems   Constitutional: Negative for chills, fatigue and fever.   HENT: Negative for ear pain, hearing loss, nosebleeds, sore throat and tinnitus.    Respiratory: Negative for cough, chest tightness, shortness of breath and wheezing.    Cardiovascular: Negative for chest pain, palpitations and leg swelling.   Gastrointestinal: Negative for abdominal pain, constipation, diarrhea, nausea and vomiting.   Musculoskeletal: Negative for arthralgias, back pain, joint swelling and myalgias.   Psychiatric/Behavioral: Positive for decreased concentration. Negative for dysphoric mood, sleep disturbance and suicidal ideas. The patient is nervous/anxious. The patient is not hyperactive.          Objective     Visit Vitals  /66 (BP Location: Left upper arm, Patient Position: Sitting)   Pulse 76   Temp 36.8 °C (98.2 °F) (Temporal)   Resp 16   Ht 1.791 m (5' 10.5\")   Wt 80.7 kg (178 lb)   SpO2 98%   BMI 25.18 kg/m²         Physical Exam  Vitals and nursing note reviewed.   Constitutional:       General: He is not in acute distress.     Appearance: He is well-developed.   HENT:      Head: Normocephalic and atraumatic.      Right Ear: Tympanic membrane and ear canal normal.      Left Ear: Tympanic membrane and ear canal normal.      Nose: Nose normal. No septal deviation.      Mouth/Throat:      Mouth: No oral lesions.      Dentition: Normal dentition.      Pharynx: Oropharynx is clear.   Eyes:      Extraocular Movements: Extraocular movements intact.      " Conjunctiva/sclera: Conjunctivae normal.      Pupils: Pupils are equal, round, and reactive to light.   Neck:      Thyroid: No thyromegaly.      Vascular: No JVD.   Cardiovascular:      Rate and Rhythm: Normal rate and regular rhythm.      Pulses: Normal pulses.      Heart sounds: Normal heart sounds. No murmur heard.  Pulmonary:      Breath sounds: Normal breath sounds. No wheezing, rhonchi or rales.   Abdominal:      General: Bowel sounds are normal. There is no distension.      Palpations: Abdomen is soft.      Tenderness: There is no abdominal tenderness.   Musculoskeletal:         General: No swelling, tenderness or deformity. Normal range of motion.      Cervical back: Normal range of motion and neck supple.      Right lower leg: No edema.      Left lower leg: No edema.   Lymphadenopathy:      Cervical: No cervical adenopathy.   Skin:     General: Skin is warm and dry.      Findings: No lesion.      Nails: There is no clubbing.   Neurological:      General: No focal deficit present.      Mental Status: He is alert and oriented to person, place, and time.      Cranial Nerves: No cranial nerve deficit.      Sensory: No sensory deficit.      Deep Tendon Reflexes: Reflexes are normal and symmetric.   Psychiatric:         Mood and Affect: Mood and affect normal.         Speech: Speech normal.         Behavior: Behavior normal.         Thought Content: Thought content normal.         Judgment: Judgment normal.           Assessment/Plan   Problem List Items Addressed This Visit        Medium    Irritable bowel syndrome with both constipation and diarrhea     Stable.  Monitor.            Low    Panic disorder     Stable.  May be a little better now that he is getting treated for ADD.         Relevant Medications    dextroamphetamine-amphetamine (ADDERALL) 10 mg tablet    Attention deficit disorder     Stable.  We will increase dose to 10 mg twice a day.  He will let me know if he has any issues in terms of supply at  the pharmacies or if the dose is not working for him.  PDMP query satisfactory.         Relevant Medications    dextroamphetamine-amphetamine (ADDERALL) 10 mg tablet       Unprioritized    RESOLVED: Exposure to STD     Stable.  Asymptomatic.  Check labs.         Relevant Orders    Chlamydia/GC RNA:ThinPrep/Urine/Swab    Hepatitis C antibody    HIV 1,2 AB P24 AG    Syphilis Antibodies    HERPES SIMPLEX VIRUS 1&2 (IGG), WITH REFLEX TO HSV2   Other Visit Diagnoses     Well adult exam    -  Primary          Return in about 6 months (around 2/18/2024) for Follow-up, Medication check.       Written education and action steps suggested to the patient are documented in After Visit Summary / Patient Instructions sections of this encounter.        Brayden Laguerre DO, FACAGUSTÍNP

## 2023-11-07 ENCOUNTER — TRANSCRIBE ORDERS (OUTPATIENT)
Dept: SCHEDULING | Age: 23
End: 2023-11-07

## 2023-11-07 DIAGNOSIS — N45.1 EPIDIDYMITIS: Primary | ICD-10-CM

## 2023-11-10 ENCOUNTER — HOSPITAL ENCOUNTER (OUTPATIENT)
Dept: RADIOLOGY | Facility: HOSPITAL | Age: 23
Discharge: HOME | End: 2023-11-10
Attending: UROLOGY
Payer: COMMERCIAL

## 2023-11-10 DIAGNOSIS — N45.1 EPIDIDYMITIS: ICD-10-CM

## 2023-11-10 PROCEDURE — 76870 US EXAM SCROTUM: CPT

## 2024-03-07 PROBLEM — F41.0 PANIC DISORDER: Status: RESOLVED | Noted: 2020-02-11 | Resolved: 2024-03-07

## 2024-03-07 PROBLEM — K63.5 POLYP OF DESCENDING COLON: Status: ACTIVE | Noted: 2024-03-07

## 2024-03-07 RX ORDER — DOXYCYCLINE 100 MG/1
CAPSULE ORAL
COMMUNITY
Start: 2024-02-13

## 2024-03-08 LAB
IGA: 154
Lab: <2
Lab: <2
TSH SERPL DL<=0.05 MIU/L-ACNC: 1.49 MIU/L

## 2024-07-11 ENCOUNTER — OFFICE VISIT (OUTPATIENT)
Dept: PRIMARY CARE | Facility: CLINIC | Age: 24
End: 2024-07-11
Payer: COMMERCIAL

## 2024-07-11 VITALS
HEIGHT: 71 IN | OXYGEN SATURATION: 98 % | SYSTOLIC BLOOD PRESSURE: 118 MMHG | WEIGHT: 170.4 LBS | DIASTOLIC BLOOD PRESSURE: 70 MMHG | HEART RATE: 89 BPM | BODY MASS INDEX: 23.85 KG/M2

## 2024-07-11 DIAGNOSIS — E83.52 HYPERCALCEMIA: Primary | ICD-10-CM

## 2024-07-11 DIAGNOSIS — Z83.49 FAMILY HISTORY OF THYROID DISEASE: ICD-10-CM

## 2024-07-11 DIAGNOSIS — Z13.220 SCREENING FOR HYPERLIPIDEMIA: ICD-10-CM

## 2024-07-11 DIAGNOSIS — K58.2 IRRITABLE BOWEL SYNDROME WITH BOTH CONSTIPATION AND DIARRHEA: ICD-10-CM

## 2024-07-11 DIAGNOSIS — Z13.21 ENCOUNTER FOR VITAMIN DEFICIENCY SCREENING: ICD-10-CM

## 2024-07-11 DIAGNOSIS — Z76.89 ENCOUNTER TO ESTABLISH CARE: ICD-10-CM

## 2024-07-11 DIAGNOSIS — E78.00 ELEVATED CHOLESTEROL: ICD-10-CM

## 2024-07-11 DIAGNOSIS — H93.92 LESION OF LEFT EAR: ICD-10-CM

## 2024-07-11 PROCEDURE — 3008F BODY MASS INDEX DOCD: CPT | Performed by: STUDENT IN AN ORGANIZED HEALTH CARE EDUCATION/TRAINING PROGRAM

## 2024-07-11 PROCEDURE — 99213 OFFICE O/P EST LOW 20 MIN: CPT | Performed by: STUDENT IN AN ORGANIZED HEALTH CARE EDUCATION/TRAINING PROGRAM

## 2024-07-11 ASSESSMENT — PATIENT HEALTH QUESTIONNAIRE - PHQ9
SUM OF ALL RESPONSES TO PHQ9 QUESTIONS 1 & 2: 1
SUM OF ALL RESPONSES TO PHQ QUESTIONS 1-9: 5

## 2024-07-11 NOTE — ASSESSMENT & PLAN NOTE
Sister and mother have history of thyroid disease  He is starting to experience some hair thinning and fatigue   Would like to check tsh    Ordered tsh along with routine labs

## 2024-07-11 NOTE — ASSESSMENT & PLAN NOTE
Stable, persistent  Alternating constipation and diarrhea  Had colonoscopy that was normal  Not distressing to him at the moment  Has a gastroenterologist  Celiac labs were negative    Encouraged an elimination diet in the future if it becomes distressing

## 2024-07-11 NOTE — ASSESSMENT & PLAN NOTE
Seen on labs in 2022 with ldl 130, hdl 39, chol 183    Will recheck lipid panel along with other routine labs today

## 2024-07-11 NOTE — PROGRESS NOTES
CARMITA Browning MD.   [facility]  [facility] IN [facility]  [facility], [address] [address] [phone]        This is a 24 y.o. male with pmh of     Past Medical History:   Diagnosis Date    Acne     Asthma     Attention deficit disorder 6/28/2023    Hyperplastic polyp of descending colon 3/7/2024    Panic disorder 02/11/2020    Pneumothorax on left 03/15/2019       Family History   Problem Relation Age of Onset    Depression Biological Mother     Hypothyroidism Biological Mother     Hyperlipidemia Biological Father     Diverticulosis Biological Father     Hyperlipidemia Biological Sister     Hypothyroidism Maternal Grandfather     Stomach cancer Maternal Grandfather        Past Surgical History:   Procedure Laterality Date    DENTAL SURGERY      wisdom teeth removed       Social History     Tobacco Use    Smoking status: Some Days     Types: Cigars    Smokeless tobacco: Never   Vaping Use    Vaping Use: Some days    Substances: Nicotine    Devices: Disposable   Substance Use Topics    Alcohol use: Yes     Alcohol/week: 1.0 standard drink of alcohol     Types: 1 Standard drinks or equivalent per week     Comment: 1 day wk    Drug use: No       Social Cont'd  Works as assistant admin for [employer]  lives with parents and 2 sisters IPV screen negative  smoking status socially 1x/month   Drinking status socially 1 drink    Diet: could be better. Balanced.  Exercise: lift and cardio 5 days a week  Seatbelt use: yes  Smoke detector: yes  CO detector: yes  Fire extinguisher: yes    Allergies  Patient has no known allergies.      Current Outpatient Medications:     doxycycline hyclate (VIBRAMYCIN) 100 mg capsule, TAKE ONE CAP DAILY X 6 WEEKS WITH FOOD THEN TAKE 5-7 DAYS AS NEEDED FOR FLARES, Disp: , Rfl:     PARoxetine (PAXIL) 30 mg tablet, Take 1 tablet (30 mg total) by mouth every morning., Disp: 90 tablet, Rfl: 1          Last set of  "basic labs:   Lab Results   Component Value Date     08/22/2022     (L) 12/14/2019    K 4.9 08/22/2022    K 3.3 (L) 12/14/2019     08/22/2022    CO2 24 08/22/2022    BUN 15 08/22/2022    CREATININE 0.91 08/22/2022    CREATININE 0.9 12/14/2019       Lab Results   Component Value Date    ALBUMIN 4.8 08/22/2022    ALBUMIN 4.7 12/14/2019    AST 30 08/22/2022    AST 24 12/14/2019    ALT 28 08/22/2022    ALT 24 12/14/2019    BILITOT 0.6 08/22/2022    BILITOT 1.3 (H) 12/14/2019       Lab Results   Component Value Date    TSH 1.49 02/17/2024       No results found for: \"INR\", \"PT\", \"PTT\"    Lab Results   Component Value Date    CHOL 183 08/22/2022    TRIG 73 08/22/2022    LDLCALC 130 (H) 08/22/2022    HDL 39 (L) 08/22/2022       Lab Results   Component Value Date    WBC 4.0 08/22/2022    WBC 6.48 12/14/2019    HGB 15.3 08/22/2022    HGB 16.3 12/14/2019    MCV 88 08/22/2022    MCV 87.5 12/14/2019    MCHC 34.0 08/22/2022    MCHC 34.8 12/14/2019     08/22/2022     12/14/2019       Lab Results   Component Value Date    TSH 1.49 02/17/2024    TSH 1.350 08/22/2022       Other labs:  Labs are pending.      Today's Encounter       Vitals:    07/11/24 1255   BP: 118/70   BP Location: Left upper arm   Patient Position: Sitting   Pulse: 89   SpO2: 98%   Weight: 77.3 kg (170 lb 6.4 oz)   Height: 1.791 m (5' 10.5\")       Body mass index is 24.1 kg/m².     BP Readings from Last 3 Encounters:   07/11/24 118/70   08/18/23 112/66   07/26/23 118/78     Wt Readings from Last 3 Encounters:   07/11/24 77.3 kg (170 lb 6.4 oz)   08/18/23 80.7 kg (178 lb)   07/26/23 82.1 kg (181 lb)     Ht Readings from Last 3 Encounters:   07/11/24 1.791 m (5' 10.5\")   08/18/23 1.791 m (5' 10.5\")   03/19/23 1.803 m (5' 11\")       Last seen: Visit date not found     Today patient presents for office visit:  -to establish care  -no cute concerns  -     Chronic problems & management  -IBS-chronic stable  -  -     Healthcare " maintenance  Imaging:    Colonoscopy: n/a however got one done earlier this year for IBD rule out which was negative  LDCT(20py):n/a  DEXA:n/a  AAA Screen:  STOPBANG STAN screen: 3/intermediate risk    Immunizations  Tdap: 2022  HPV:  Flu:  Shingrix:  PCV13:   PPSV23:   PCV20:  RSV:  COVID:     Dentist: yes, bi annual  Podiatrist:n/a  Ophthalmology: n/a    Labs  Routine Labs: pending  HCV Screen: 2023  HIV Screen: 2023  PSA:n/a  Lipid:  A1c:  Pr:Cr:n/a      Physical Exam  Vitals and nursing note reviewed.   Constitutional:       General: He is not in acute distress.     Appearance: He is not ill-appearing or diaphoretic.   HENT:      Head: Normocephalic and atraumatic.        Comments: Mobile firm subcutaneous nodule     Nose: No congestion or rhinorrhea.      Mouth/Throat:      Mouth: Mucous membranes are moist.      Pharynx: Oropharynx is clear. No oropharyngeal exudate or posterior oropharyngeal erythema.   Eyes:      Extraocular Movements: Extraocular movements intact.      Conjunctiva/sclera: Conjunctivae normal.      Pupils: Pupils are equal, round, and reactive to light.   Cardiovascular:      Rate and Rhythm: Normal rate and regular rhythm.      Pulses: Normal pulses.      Heart sounds: Normal heart sounds. No murmur heard.  Pulmonary:      Effort: Pulmonary effort is normal. No respiratory distress.      Breath sounds: Normal breath sounds. No wheezing.   Abdominal:      General: Bowel sounds are normal. There is no distension.      Palpations: Abdomen is soft.      Tenderness: There is no abdominal tenderness. There is no guarding.      Hernia: No hernia is present.   Musculoskeletal:         General: No swelling or tenderness. Normal range of motion.      Cervical back: Normal range of motion. No rigidity.   Lymphadenopathy:      Cervical: No cervical adenopathy.   Neurological:      General: No focal deficit present.      Mental Status: Mental status is at baseline.   Psychiatric:         Mood and Affect:  Mood normal.         Thought Content: Thought content normal.            Review of Systems       Overall plan for today  Routine labs    Problem List Items Addressed This Visit       Irritable bowel syndrome with both constipation and diarrhea     Stable, persistent  Alternating constipation and diarrhea  Had colonoscopy that was normal  Not distressing to him at the moment  Has a gastroenterologist  Celiac labs were negative    Encouraged an elimination diet in the future if it becomes distressing         Family history of thyroid disease     Sister and mother have history of thyroid disease  He is starting to experience some hair thinning and fatigue   Would like to check tsh    Ordered tsh along with routine labs         Relevant Orders    Phosphorus    TSH w reflex FT4    Encounter to establish care     Was at [facility] but switched because of closer proximity to home  No main concerns at this time    Routine labs ordered         Relevant Orders    CBC and Differential    Comprehensive metabolic panel    Lesion of left ear     Behind left ear close to mastoid   Firm mobile mass that has been there since childhood  Likely lipoma/cyst  Has not changed in character or size over the years    Monitor for now however, if is aesthetically unpleasing offered a derm consult for removal in the future            Elevated cholesterol     Seen on labs in 2022 with ldl 130, hdl 39, chol 183    Will recheck lipid panel along with other routine labs today          Other Visit Diagnoses       Hypercalcemia    -  Primary    Relevant Orders    Comprehensive metabolic panel    Phosphorus    Screening for hyperlipidemia        Relevant Orders    Lipid panel    Encounter for vitamin deficiency screening        Relevant Orders    Magnesium    Vitamin D 25 hydroxy    Vitamin B12                 Ellie Browning MD     07/11/24

## 2024-07-11 NOTE — ASSESSMENT & PLAN NOTE
Was at [place] location but switched because of closer proximity to home  No main concerns at this time    Routine labs ordered

## 2024-07-11 NOTE — ASSESSMENT & PLAN NOTE
Behind left ear close to mastoid   Firm mobile mass that has been there since childhood  Likely lipoma/cyst  Has not changed in character or size over the years    Monitor for now however, if is aesthetically unpleasing offered a derm consult for removal in the future

## 2024-07-13 LAB
25(OH)D3+25(OH)D2 SERPL-MCNC: 45.3 NG/ML (ref 30–100)
ALBUMIN SERPL-MCNC: 4.8 G/DL (ref 4.3–5.2)
ALP SERPL-CCNC: 55 IU/L (ref 44–121)
ALT SERPL-CCNC: 18 IU/L (ref 0–44)
AST SERPL-CCNC: 19 IU/L (ref 0–40)
BASOPHILS # BLD AUTO: 0 X10E3/UL (ref 0–0.2)
BASOPHILS NFR BLD AUTO: 1 %
BILIRUB SERPL-MCNC: 0.4 MG/DL (ref 0–1.2)
BUN SERPL-MCNC: 15 MG/DL (ref 6–20)
BUN/CREAT SERPL: 19 (ref 9–20)
CALCIUM SERPL-MCNC: 9.6 MG/DL (ref 8.7–10.2)
CHLORIDE SERPL-SCNC: 102 MMOL/L (ref 96–106)
CHOLEST SERPL-MCNC: 212 MG/DL (ref 100–199)
CO2 SERPL-SCNC: 24 MMOL/L (ref 20–29)
CREAT SERPL-MCNC: 0.8 MG/DL (ref 0.76–1.27)
EGFRCR SERPLBLD CKD-EPI 2021: 127 ML/MIN/1.73
EOSINOPHIL # BLD AUTO: 0.1 X10E3/UL (ref 0–0.4)
EOSINOPHIL NFR BLD AUTO: 1 %
ERYTHROCYTE [DISTWIDTH] IN BLOOD BY AUTOMATED COUNT: 12.1 % (ref 11.6–15.4)
GLOBULIN SER CALC-MCNC: 1.9 G/DL (ref 1.5–4.5)
GLUCOSE SERPL-MCNC: 73 MG/DL (ref 70–99)
HCT VFR BLD AUTO: 44.3 % (ref 37.5–51)
HDLC SERPL-MCNC: 45 MG/DL
HGB BLD-MCNC: 14.8 G/DL (ref 13–17.7)
IMM GRANULOCYTES # BLD AUTO: 0 X10E3/UL (ref 0–0.1)
IMM GRANULOCYTES NFR BLD AUTO: 0 %
LDLC SERPL CALC-MCNC: 154 MG/DL (ref 0–99)
LYMPHOCYTES # BLD AUTO: 1.6 X10E3/UL (ref 0.7–3.1)
LYMPHOCYTES NFR BLD AUTO: 41 %
MAGNESIUM SERPL-MCNC: 2 MG/DL (ref 1.6–2.3)
MCH RBC QN AUTO: 29.4 PG (ref 26.6–33)
MCHC RBC AUTO-ENTMCNC: 33.4 G/DL (ref 31.5–35.7)
MCV RBC AUTO: 88 FL (ref 79–97)
MONOCYTES # BLD AUTO: 0.4 X10E3/UL (ref 0.1–0.9)
MONOCYTES NFR BLD AUTO: 9 %
NEUTROPHILS # BLD AUTO: 1.8 X10E3/UL (ref 1.4–7)
NEUTROPHILS NFR BLD AUTO: 48 %
PHOSPHATE SERPL-MCNC: 3.7 MG/DL (ref 2.8–4.1)
PLATELET # BLD AUTO: 250 X10E3/UL (ref 150–450)
POTASSIUM SERPL-SCNC: 4.4 MMOL/L (ref 3.5–5.2)
PROT SERPL-MCNC: 6.7 G/DL (ref 6–8.5)
RBC # BLD AUTO: 5.03 X10E6/UL (ref 4.14–5.8)
SODIUM SERPL-SCNC: 141 MMOL/L (ref 134–144)
T4 FREE SERPL-MCNC: 1.36 NG/DL (ref 0.82–1.77)
TRIGL SERPL-MCNC: 74 MG/DL (ref 0–149)
TSH SERPL DL<=0.005 MIU/L-ACNC: 2.28 UIU/ML (ref 0.45–4.5)
VIT B12 SERPL-MCNC: 670 PG/ML (ref 232–1245)
VLDLC SERPL CALC-MCNC: 13 MG/DL (ref 5–40)
WBC # BLD AUTO: 3.9 X10E3/UL (ref 3.4–10.8)

## 2025-04-14 ENCOUNTER — OFFICE VISIT (OUTPATIENT)
Dept: PRIMARY CARE | Facility: CLINIC | Age: 25
End: 2025-04-14
Payer: COMMERCIAL

## 2025-04-14 VITALS
DIASTOLIC BLOOD PRESSURE: 72 MMHG | RESPIRATION RATE: 16 BRPM | BODY MASS INDEX: 24.22 KG/M2 | HEART RATE: 79 BPM | TEMPERATURE: 97.8 F | SYSTOLIC BLOOD PRESSURE: 118 MMHG | OXYGEN SATURATION: 97 % | HEIGHT: 71 IN | WEIGHT: 173 LBS

## 2025-04-14 DIAGNOSIS — F90.0 ATTENTION DEFICIT HYPERACTIVITY DISORDER (ADHD), PREDOMINANTLY INATTENTIVE TYPE: Primary | ICD-10-CM

## 2025-04-14 PROCEDURE — 99214 OFFICE O/P EST MOD 30 MIN: CPT | Performed by: STUDENT IN AN ORGANIZED HEALTH CARE EDUCATION/TRAINING PROGRAM

## 2025-04-14 PROCEDURE — 3008F BODY MASS INDEX DOCD: CPT | Performed by: STUDENT IN AN ORGANIZED HEALTH CARE EDUCATION/TRAINING PROGRAM

## 2025-04-14 RX ORDER — DEXTROAMPHETAMINE SACCHARATE, AMPHETAMINE ASPARTATE MONOHYDRATE, DEXTROAMPHETAMINE SULFATE AND AMPHETAMINE SULFATE 2.5; 2.5; 2.5; 2.5 MG/1; MG/1; MG/1; MG/1
10 CAPSULE, EXTENDED RELEASE ORAL EVERY MORNING
Qty: 30 CAPSULE | Refills: 0 | Status: SHIPPED | OUTPATIENT
Start: 2025-04-14 | End: 2025-04-23 | Stop reason: ALTCHOICE

## 2025-04-14 ASSESSMENT — ENCOUNTER SYMPTOMS
APPETITE CHANGE: 0
HEMATOLOGIC/LYMPHATIC NEGATIVE: 1
DYSPHORIC MOOD: 0
DIZZINESS: 0
DIARRHEA: 0
VOMITING: 0
DIAPHORESIS: 0
ABDOMINAL PAIN: 0
ACTIVITY CHANGE: 0
NUMBNESS: 0
FATIGUE: 0
ALLERGIC/IMMUNOLOGIC NEGATIVE: 1
DYSURIA: 0
HEADACHES: 0
BLOOD IN STOOL: 0
CHILLS: 0
WHEEZING: 0
COUGH: 0
DIFFICULTY URINATING: 0
FEVER: 0
EYES NEGATIVE: 1
UNEXPECTED WEIGHT CHANGE: 0
ARTHRALGIAS: 0
ENDOCRINE NEGATIVE: 1
WEAKNESS: 0
NAUSEA: 0
SHORTNESS OF BREATH: 0
PALPITATIONS: 0
NERVOUS/ANXIOUS: 1

## 2025-04-14 NOTE — PROGRESS NOTES
CARMITA Browning MD.   [facility]  [facility] [address]  [phone]       This is a 25 y.o. male with pmh of     Past Medical History:   Diagnosis Date    Acne     Asthma     Attention deficit disorder 6/28/2023    Hyperplastic polyp of descending colon 3/7/2024    Panic disorder 02/11/2020    Pneumothorax on left 03/15/2019       Family History   Problem Relation Name Age of Onset    Depression Biological Mother      Hypothyroidism Biological Mother      Hyperlipidemia Biological Father      Diverticulosis Biological Father      Hyperlipidemia Biological Sister      Hypothyroidism Maternal Grandfather      Stomach cancer Maternal Grandfather         Past Surgical History   Procedure Laterality Date    Dental surgery      wisdom teeth removed       Social History     Tobacco Use    Smoking status: Some Days     Types: Cigars     Passive exposure: Current    Smokeless tobacco: Never    Tobacco comments:     Smokes cigars when he goes fishing.    Vaping Use    Vaping status: Some Days    Substances: Nicotine    Devices: Disposable   Substance Use Topics    Alcohol use: Yes     Alcohol/week: 1.0 standard drink of alcohol     Types: 1 Standard drinks or equivalent per week     Comment: 1 day wk    Drug use: No       Social Cont'd  Works as cyber tech  lives with parents and 2 sisters IPV screen negative  smoking status socially 1x/month   Drinking status socially 1 drink    Diet: could be better. Balanced.  Exercise: lift and cardio 5 days a week  Seatbelt use: yes  Smoke detector: yes  CO detector: yes  Fire extinguisher: yes    Allergies  Patient has no known allergies.      Current Outpatient Medications:     amphetamine-dextroamphetamine XR (ADDERALL XR) 10 mg 24 hr capsule, Take 1 capsule (10 mg total) by mouth every morning., Disp: 30 capsule, Rfl: 0    doxycycline hyclate (VIBRAMYCIN) 100 mg capsule, TAKE ONE CAP DAILY  "X 6 WEEKS WITH FOOD THEN TAKE 5-7 DAYS AS NEEDED FOR FLARES, Disp: , Rfl:     PARoxetine (PAXIL) 30 mg tablet, Take 1 tablet (30 mg total) by mouth every morning., Disp: 90 tablet, Rfl: 1          Last set of basic labs:   Lab Results   Component Value Date     07/12/2024     08/22/2022     (L) 12/14/2019    K 4.4 07/12/2024    K 4.9 08/22/2022    K 3.3 (L) 12/14/2019     07/12/2024    CO2 24 07/12/2024    BUN 15 07/12/2024    CREATININE 0.80 07/12/2024    CREATININE 0.91 08/22/2022    CREATININE 0.9 12/14/2019       Lab Results   Component Value Date    ALBUMIN 4.8 07/12/2024    ALBUMIN 4.8 08/22/2022    ALBUMIN 4.7 12/14/2019    AST 19 07/12/2024    AST 30 08/22/2022    AST 24 12/14/2019    ALT 18 07/12/2024    ALT 28 08/22/2022    ALT 24 12/14/2019    BILITOT 0.4 07/12/2024    BILITOT 0.6 08/22/2022    BILITOT 1.3 (H) 12/14/2019       Lab Results   Component Value Date    TSH 2.280 07/12/2024       No results found for: \"INR\", \"PT\", \"PTT\"    Lab Results   Component Value Date    CHOL 212 (H) 07/12/2024    CHOL 183 08/22/2022    TRIG 74 07/12/2024    TRIG 73 08/22/2022    LDLCALC 154 (H) 07/12/2024    LDLCALC 130 (H) 08/22/2022    HDL 45 07/12/2024    HDL 39 (L) 08/22/2022       Lab Results   Component Value Date    WBC 3.9 07/12/2024    WBC 4.0 08/22/2022    WBC 6.48 12/14/2019    HGB 14.8 07/12/2024    HGB 15.3 08/22/2022    HGB 16.3 12/14/2019    MCV 88 07/12/2024    MCV 88 08/22/2022    MCV 87.5 12/14/2019    MCHC 33.4 07/12/2024    MCHC 34.0 08/22/2022    MCHC 34.8 12/14/2019     07/12/2024     08/22/2022     12/14/2019       Lab Results   Component Value Date    TSH 2.280 07/12/2024    TSH 1.49 02/17/2024    TSH 1.350 08/22/2022    B12 670 07/12/2024       Other labs:  Labs are pending.      Today's Encounter       Vitals:    04/14/25 1512   BP: 118/72   BP Location: Left upper arm   Patient Position: Sitting   Pulse: 79   Resp: 16   Temp: 36.6 °C (97.8 °F) " "  TempSrc: Temporal   SpO2: 97%   Weight: 78.5 kg (173 lb)   Height: 1.791 m (5' 10.5\")       Body mass index is 24.47 kg/m².     BP Readings from Last 3 Encounters:   04/14/25 118/72   07/11/24 118/70   08/18/23 112/66     Wt Readings from Last 3 Encounters:   04/14/25 78.5 kg (173 lb)   07/11/24 77.3 kg (170 lb 6.4 oz)   08/18/23 80.7 kg (178 lb)     Ht Readings from Last 3 Encounters:   04/14/25 1.791 m (5' 10.5\")   07/11/24 1.791 m (5' 10.5\")   08/18/23 1.791 m (5' 10.5\")       Last seen: Visit date not found     Today patient presents for office visit:  -here to discuss resumption of medication  -     Chronic problems & management  -IBS-chronic stable  -  -     Healthcare maintenance  Imaging:    Colonoscopy: n/a however got one done earlier this year for IBD rule out which was negative  LDCT(20py):n/a  DEXA:n/a  AAA Screen:  STOPBANG STAN screen: 3/intermediate risk    Immunizations  Tdap: 2022  HPV:  Flu:  Shingrix:  PCV13:   PPSV23:   PCV20:  RSV:  COVID:     Dentist: yes, bi annual  Podiatrist:n/a  Ophthalmology: n/a    Labs  Routine Labs: pending  HCV Screen: 2023  HIV Screen: 2023  PSA:n/a  Lipid:  A1c:  Pr:Cr:n/a      Physical Exam   Here for medication resumption    Review of Systems   Constitutional:  Negative for activity change, appetite change, chills, diaphoresis, fatigue, fever and unexpected weight change.   HENT: Negative.     Eyes: Negative.    Respiratory:  Negative for cough, shortness of breath and wheezing.    Cardiovascular:  Negative for chest pain, palpitations and leg swelling.   Gastrointestinal:  Negative for abdominal pain, blood in stool, diarrhea, nausea and vomiting.   Endocrine: Negative.    Genitourinary:  Negative for difficulty urinating and dysuria.   Musculoskeletal:  Negative for arthralgias.   Skin: Negative.    Allergic/Immunologic: Negative.    Neurological:  Negative for dizziness, weakness, numbness and headaches.   Hematological: Negative.    Psychiatric/Behavioral:  " Negative for dysphoric mood and suicidal ideas. The patient is nervous/anxious (mostly social anxiety).           Overall plan for today  Resume adderall    Problem List Items Addressed This Visit       Attention deficit disorder - Primary    Was on adderall 10 mg 2 years ago however took himself off of it  He would like to get back on it for attention and some social anxiety  He is currently working with a therapist and believes his anxiety is driven by his add    Will restart her adderall today 10 mg XR-if any adverse effects such as insomnia will switch to 12 hour  Substance agreement filled  F/u in 3 mo  PDMP no red flags         Relevant Medications    amphetamine-dextroamphetamine XR (ADDERALL XR) 10 mg 24 hr capsule            Ellie Browning MD     04/14/25

## 2025-04-14 NOTE — ASSESSMENT & PLAN NOTE
Was on adderall 10 mg 2 years ago however took himself off of it  He would like to get back on it for attention and some social anxiety  He is currently working with a therapist and believes his anxiety is driven by his add    Will restart her adderall today 10 mg XR-if any adverse effects such as insomnia will switch to 12 hour  Substance agreement filled  F/u in 3 mo  PDMP no red flags

## 2025-04-16 ENCOUNTER — TELEPHONE (OUTPATIENT)
Dept: PRIMARY CARE | Facility: CLINIC | Age: 25
End: 2025-04-16
Payer: COMMERCIAL

## 2025-04-16 NOTE — TELEPHONE ENCOUNTER
Called to confirm medication was picked up by patient. Medication picked up and total cost for patient was $10.25.

## 2025-04-16 NOTE — TELEPHONE ENCOUNTER
Spoke to [facility] regarding medication for Adderall XR per pharmacist patient picked up medication no prior authorization required. Patient paid out of pocket $10.25.

## 2025-04-23 RX ORDER — LISDEXAMFETAMINE DIMESYLATE 20 MG/1
20 CAPSULE ORAL EVERY MORNING
Qty: 30 CAPSULE | Refills: 0 | Status: SHIPPED | OUTPATIENT
Start: 2025-04-23 | End: 2025-05-23

## 2025-06-27 ENCOUNTER — TELEMEDICINE (OUTPATIENT)
Dept: PRIMARY CARE | Facility: CLINIC | Age: 25
End: 2025-06-27
Payer: COMMERCIAL

## 2025-06-27 DIAGNOSIS — F41.9 ANXIETY: ICD-10-CM

## 2025-06-27 DIAGNOSIS — F90.0 ATTENTION DEFICIT HYPERACTIVITY DISORDER (ADHD), PREDOMINANTLY INATTENTIVE TYPE: Primary | ICD-10-CM

## 2025-06-27 PROCEDURE — 99214 OFFICE O/P EST MOD 30 MIN: CPT | Mod: 95 | Performed by: STUDENT IN AN ORGANIZED HEALTH CARE EDUCATION/TRAINING PROGRAM

## 2025-06-27 RX ORDER — FLUOXETINE 20 MG/1
20 CAPSULE ORAL DAILY
Qty: 90 CAPSULE | Refills: 3 | Status: SHIPPED | OUTPATIENT
Start: 2025-06-27 | End: 2026-06-27

## 2025-06-27 NOTE — PROGRESS NOTES
Verification of Patient Location:  The patient affirms they are currently located in the following state: [place]    Are you in your home or a private residence? Yes    Request for Consent:    Audio and Video Encounter   Hello, my name is Ellie Browning MD.  Before we proceed, can you please verify your identification by telling me your full name and date of birth?  Can you tell me who is in the room with you?    You and I are about to have a telemedicine check-in or visit because you have requested it.  This is a live video-conference.  I am a real person, speaking to you in real time.  There is no one else with me on the video-conference. I am not recording this conversation and I am asking you not to record it.  This telemedicine visit will be billed to your health insurance or you, if you are self-insured.  You understand you will be responsible for any copayments or coinsurances that apply to your telemedicine visit.  Communication platform used for this encounter:  [employer] Video Visit (Epic Video Client)       Before starting our telemedicine visit, I am required to get your consent for this virtual check-in or visit by telemedicine. Do you consent?    Patient Response to Request for Consent:  Yes      Visit Documentation:  Subjective     Patient ID: Zelalem Kasper is a 25 y.o. male.  2000      HPI    The following have been reviewed and updated as appropriate in this visit:        Review of Systems    Assessment & Plan  Attention deficit hyperactivity disorder (ADHD), predominantly inattentive type  Was on adderall 10 mg 2 years ago however took himself off of it  Recently decided that he wanted to try it again  Unfortunately had adverse side effects from Adderall making him extremely tired  We then tried Vyvanse which he enjoyed however has recently stopped taking this as well and would like to focus more on his anxiety symptoms instead of his ADD symptoms           Anxiety  Mostly associated  with social situations  Is affecting interactions and relationships at this point  Was started on paroxetine about 5 years ago which he eventually was weaned off of  Would like to entertain starting an SSRI again    We discussed the risks and benefits about Prozac, Lexapro, and Wellbutrin  Will be sending Prozac to his pharmacy to start at this time           Time Spent:  I spent 20 minutes on this date of service performing the following activities: obtaining history, entering orders, documenting, preparing for visit, obtaining / reviewing records, and providing counseling and education.

## 2025-06-27 NOTE — ASSESSMENT & PLAN NOTE
Mostly associated with social situations  Is affecting interactions and relationships at this point  Was started on paroxetine about 5 years ago which he eventually was weaned off of  Would like to entertain starting an SSRI again    We discussed the risks and benefits about Prozac, Lexapro, and Wellbutrin  Will be sending Prozac to his pharmacy to start at this time

## 2025-06-27 NOTE — ASSESSMENT & PLAN NOTE
Was on adderall 10 mg 2 years ago however took himself off of it  Recently decided that he wanted to try it again  Unfortunately had adverse side effects from Adderall making him extremely tired  We then tried Vyvanse which he enjoyed however has recently stopped taking this as well and would like to focus more on his anxiety symptoms instead of his ADD symptoms

## 2025-07-14 ENCOUNTER — OFFICE VISIT (OUTPATIENT)
Dept: PRIMARY CARE | Facility: CLINIC | Age: 25
End: 2025-07-14
Payer: COMMERCIAL

## 2025-07-14 VITALS
BODY MASS INDEX: 23.94 KG/M2 | TEMPERATURE: 97.6 F | OXYGEN SATURATION: 98 % | HEART RATE: 96 BPM | RESPIRATION RATE: 16 BRPM | SYSTOLIC BLOOD PRESSURE: 118 MMHG | HEIGHT: 71 IN | WEIGHT: 171 LBS | DIASTOLIC BLOOD PRESSURE: 68 MMHG

## 2025-07-14 DIAGNOSIS — Z79.899 OTHER LONG TERM (CURRENT) DRUG THERAPY: ICD-10-CM

## 2025-07-14 DIAGNOSIS — A64 STI (SEXUALLY TRANSMITTED INFECTION): ICD-10-CM

## 2025-07-14 DIAGNOSIS — Z11.4 SCREENING FOR HIV (HUMAN IMMUNODEFICIENCY VIRUS): ICD-10-CM

## 2025-07-14 DIAGNOSIS — Z13.220 SCREENING, LIPID: ICD-10-CM

## 2025-07-14 DIAGNOSIS — Z11.59 NEED FOR HEPATITIS C SCREENING TEST: ICD-10-CM

## 2025-07-14 DIAGNOSIS — Z00.00 PE (PHYSICAL EXAM), ANNUAL: Primary | ICD-10-CM

## 2025-07-14 PROCEDURE — 99395 PREV VISIT EST AGE 18-39: CPT | Performed by: STUDENT IN AN ORGANIZED HEALTH CARE EDUCATION/TRAINING PROGRAM

## 2025-07-14 PROCEDURE — 3008F BODY MASS INDEX DOCD: CPT | Performed by: STUDENT IN AN ORGANIZED HEALTH CARE EDUCATION/TRAINING PROGRAM

## 2025-07-14 ASSESSMENT — ENCOUNTER SYMPTOMS
VOMITING: 0
NUMBNESS: 0
DIFFICULTY URINATING: 0
DYSURIA: 0
NAUSEA: 0
DIAPHORESIS: 0
DIZZINESS: 0
FEVER: 0
ALLERGIC/IMMUNOLOGIC NEGATIVE: 1
ABDOMINAL PAIN: 0
SHORTNESS OF BREATH: 0
CHILLS: 0
WHEEZING: 0
HEADACHES: 0
BLOOD IN STOOL: 0
ENDOCRINE NEGATIVE: 1
EYES NEGATIVE: 1
APPETITE CHANGE: 0
ACTIVITY CHANGE: 0
FATIGUE: 0
WEAKNESS: 0
UNEXPECTED WEIGHT CHANGE: 0
DIARRHEA: 0
HEMATOLOGIC/LYMPHATIC NEGATIVE: 1
PALPITATIONS: 0
COUGH: 0
DYSPHORIC MOOD: 0
ARTHRALGIAS: 0

## 2025-07-14 ASSESSMENT — PATIENT HEALTH QUESTIONNAIRE - PHQ9: SUM OF ALL RESPONSES TO PHQ9 QUESTIONS 1 & 2: 0

## 2025-07-14 NOTE — ASSESSMENT & PLAN NOTE
Here for annual exam  No acute concerns  Recently started on Prozac 2 weeks ago and tolerating well  Up-to-date with Tdap-2022    Routine labs ordered  STI screening ordered

## 2025-07-14 NOTE — PROGRESS NOTES
CARMITA Browning MD.   [facility]  [address] [place] [address]   [phone]         Consent obtained from patient and all parties present in the room? yes  I have obtained the consent of everyone present in the room to make an audio recording of this visit to assist me in   documenting the encounter in the EMR.    This is a 25 y.o. male with pmh of     Past Medical History:   Diagnosis Date    Acne     Asthma     Attention deficit disorder 6/28/2023    Hyperplastic polyp of descending colon 3/7/2024    Panic disorder 02/11/2020    Pneumothorax on left 03/15/2019       Family History   Problem Relation Name Age of Onset    Depression Biological Mother      Hypothyroidism Biological Mother      Hyperlipidemia Biological Father      Diverticulosis Biological Father      Hyperlipidemia Biological Sister      Hypothyroidism Maternal Grandfather      Stomach cancer Maternal Grandfather         Past Surgical History   Procedure Laterality Date    Dental surgery      wisdom teeth removed       Social History     Tobacco Use    Smoking status: Some Days     Types: Cigars     Passive exposure: Current    Smokeless tobacco: Never    Tobacco comments:     Smokes cigars when he goes fishing.    Vaping Use    Vaping status: Former    Substances: Nicotine    Devices: Disposable   Substance Use Topics    Alcohol use: Yes     Alcohol/week: 1.0 standard drink of alcohol     Types: 1 Standard drinks or equivalent per week     Comment: 1 day wk    Drug use: No         Allergies  Patient has no known allergies.      Current Outpatient Medications:     lisdexamfetamine (VYVANSE) 20 mg capsule, Take 1 capsule (20 mg total) by mouth every morning., Disp: 30 capsule, Rfl: 0    doxycycline hyclate (VIBRAMYCIN) 100 mg capsule, TAKE ONE CAP DAILY X 6 WEEKS WITH FOOD THEN TAKE 5-7 DAYS AS NEEDED FOR FLARES, Disp: , Rfl:     FLUoxetine (PROzac) 20 mg capsule, Take 1 capsule (20 mg total) by mouth  "daily., Disp: 90 capsule, Rfl: 3          Last set of basic labs:   Lab Results   Component Value Date     07/12/2024     08/22/2022     (L) 12/14/2019    K 4.4 07/12/2024    K 4.9 08/22/2022    K 3.3 (L) 12/14/2019     07/12/2024    CO2 24 07/12/2024    BUN 15 07/12/2024    CREATININE 0.80 07/12/2024    CREATININE 0.91 08/22/2022    CREATININE 0.9 12/14/2019       Lab Results   Component Value Date    ALBUMIN 4.8 07/12/2024    ALBUMIN 4.8 08/22/2022    ALBUMIN 4.7 12/14/2019    AST 19 07/12/2024    AST 30 08/22/2022    AST 24 12/14/2019    ALT 18 07/12/2024    ALT 28 08/22/2022    ALT 24 12/14/2019    BILITOT 0.4 07/12/2024    BILITOT 0.6 08/22/2022    BILITOT 1.3 (H) 12/14/2019       Lab Results   Component Value Date    TSH 2.280 07/12/2024       No results found for: \"INR\", \"PT\", \"PTT\"    Lab Results   Component Value Date    CHOL 212 (H) 07/12/2024    CHOL 183 08/22/2022    TRIG 74 07/12/2024    TRIG 73 08/22/2022    LDLCALC 154 (H) 07/12/2024    LDLCALC 130 (H) 08/22/2022    HDL 45 07/12/2024    HDL 39 (L) 08/22/2022       Lab Results   Component Value Date    WBC 3.9 07/12/2024    WBC 4.0 08/22/2022    WBC 6.48 12/14/2019    HGB 14.8 07/12/2024    HGB 15.3 08/22/2022    HGB 16.3 12/14/2019    MCV 88 07/12/2024    MCV 88 08/22/2022    MCV 87.5 12/14/2019    MCHC 33.4 07/12/2024    MCHC 34.0 08/22/2022    MCHC 34.8 12/14/2019     07/12/2024     08/22/2022     12/14/2019       Lab Results   Component Value Date    TSH 2.280 07/12/2024    TSH 1.49 02/17/2024    TSH 1.350 08/22/2022    B12 670 07/12/2024       Results      Other labs:  Labs are pending.      Today's Encounter       Vitals:    07/14/25 1755   BP: 118/68   BP Location: Left upper arm   Patient Position: Sitting   Pulse: 96   Resp: 16   Temp: 36.4 °C (97.6 °F)   TempSrc: Temporal   SpO2: 98%   Weight: 77.6 kg (171 lb)   Height: 1.791 m (5' 10.5\")       Body mass index is 24.19 kg/m².     BP Readings from " "Last 3 Encounters:   07/14/25 118/68   04/14/25 118/72   07/11/24 118/70     Wt Readings from Last 3 Encounters:   07/14/25 77.6 kg (171 lb)   04/14/25 78.5 kg (173 lb)   07/11/24 77.3 kg (170 lb 6.4 oz)     Ht Readings from Last 3 Encounters:   07/14/25 1.791 m (5' 10.5\")   04/14/25 1.791 m (5' 10.5\")   07/11/24 1.791 m (5' 10.5\")       Last seen: 4/14/2025     Today patient presents for office visit:  History of Present Illness    Here for annual physical, no acute concerns. Up to date with all health maintenance at this time.       Chronic problems & management  -  -  -    Physical Exam  Vitals and nursing note reviewed.   Constitutional:       General: He is not in acute distress.     Appearance: He is not ill-appearing or diaphoretic.   HENT:      Head: Normocephalic and atraumatic.   Eyes:      Extraocular Movements: Extraocular movements intact.      Conjunctiva/sclera: Conjunctivae normal.      Pupils: Pupils are equal, round, and reactive to light.   Cardiovascular:      Rate and Rhythm: Normal rate and regular rhythm.      Pulses: Normal pulses.      Heart sounds: Normal heart sounds. No murmur heard.  Pulmonary:      Effort: Pulmonary effort is normal. No respiratory distress.      Breath sounds: Normal breath sounds. No wheezing.   Abdominal:      General: Bowel sounds are normal. There is no distension.      Palpations: Abdomen is soft.      Tenderness: There is no abdominal tenderness. There is no guarding.      Hernia: No hernia is present.   Musculoskeletal:         General: No swelling or tenderness. Normal range of motion.      Cervical back: Normal range of motion. No rigidity.   Lymphadenopathy:      Cervical: No cervical adenopathy.   Neurological:      General: No focal deficit present.      Mental Status: Mental status is at baseline.   Psychiatric:         Mood and Affect: Mood normal.         Thought Content: Thought content normal.            Review of Systems   Constitutional:  Negative " for activity change, appetite change, chills, diaphoresis, fatigue, fever and unexpected weight change.   HENT: Negative.     Eyes: Negative.    Respiratory:  Negative for cough, shortness of breath and wheezing.    Cardiovascular:  Negative for chest pain, palpitations and leg swelling.   Gastrointestinal:  Negative for abdominal pain, blood in stool, diarrhea, nausea and vomiting.   Endocrine: Negative.    Genitourinary:  Negative for difficulty urinating and dysuria.   Musculoskeletal:  Negative for arthralgias.   Skin: Negative.    Allergic/Immunologic: Negative.    Neurological:  Negative for dizziness, weakness, numbness and headaches.   Hematological: Negative.    Psychiatric/Behavioral:  Negative for dysphoric mood and suicidal ideas.             Assessment & Plan      Problem List Items Addressed This Visit       PE (physical exam), annual - Primary    Here for annual exam  No acute concerns  Recently started on Prozac 2 weeks ago and tolerating well  Up-to-date with Tdap-2022    Routine labs ordered  STI screening ordered          Relevant Orders    Comprehensive metabolic panel    CBC and Differential    HIV 1,2 AB P24 AG    Hepatitis C antibody    Chlamydia/GC RNA:ThinPrep,Urine,Swab    RPR     Other Visit Diagnoses         Other long term (current) drug therapy          Screening, lipid        Relevant Orders    Lipid panel      Screening for HIV (human immunodeficiency virus)          Need for hepatitis C screening test          STI (sexually transmitted infection)        Relevant Orders    HIV 1,2 AB P24 AG    Chlamydia/GC RNA:ThinPrep,Urine,Swab    RPR                    07/14/25     Attestation

## 2025-07-17 LAB
ALBUMIN SERPL-MCNC: 5 G/DL (ref 4.3–5.2)
ALP SERPL-CCNC: 68 IU/L (ref 44–121)
ALT SERPL-CCNC: 25 IU/L (ref 0–44)
AST SERPL-CCNC: 23 IU/L (ref 0–40)
BASOPHILS # BLD AUTO: 0 X10E3/UL (ref 0–0.2)
BASOPHILS NFR BLD AUTO: 1 %
BILIRUB SERPL-MCNC: 0.8 MG/DL (ref 0–1.2)
BUN SERPL-MCNC: 17 MG/DL (ref 6–20)
BUN/CREAT SERPL: 19 (ref 9–20)
CALCIUM SERPL-MCNC: 9.6 MG/DL (ref 8.7–10.2)
CHLORIDE SERPL-SCNC: 101 MMOL/L (ref 96–106)
CHOLEST SERPL-MCNC: 194 MG/DL (ref 100–199)
CO2 SERPL-SCNC: 23 MMOL/L (ref 20–29)
CREAT SERPL-MCNC: 0.9 MG/DL (ref 0.76–1.27)
EGFRCR SERPLBLD CKD-EPI 2021: 122 ML/MIN/1.73
EOSINOPHIL # BLD AUTO: 0 X10E3/UL (ref 0–0.4)
EOSINOPHIL NFR BLD AUTO: 1 %
ERYTHROCYTE [DISTWIDTH] IN BLOOD BY AUTOMATED COUNT: 11.9 % (ref 11.6–15.4)
GLOBULIN SER CALC-MCNC: 2 G/DL (ref 1.5–4.5)
GLUCOSE SERPL-MCNC: 73 MG/DL (ref 70–99)
HCT VFR BLD AUTO: 46.8 % (ref 37.5–51)
HCV IGG SERPL QL IA: NON REACTIVE
HDLC SERPL-MCNC: 52 MG/DL
HGB BLD-MCNC: 15.5 G/DL (ref 13–17.7)
HIV 1+2 AB+HIV1 P24 AG SERPL QL IA: NON REACTIVE
IMM GRANULOCYTES # BLD AUTO: 0 X10E3/UL (ref 0–0.1)
IMM GRANULOCYTES NFR BLD AUTO: 1 %
LDLC SERPL CALC-MCNC: 130 MG/DL (ref 0–99)
LYMPHOCYTES # BLD AUTO: 1.3 X10E3/UL (ref 0.7–3.1)
LYMPHOCYTES NFR BLD AUTO: 30 %
MCH RBC QN AUTO: 30.1 PG (ref 26.6–33)
MCHC RBC AUTO-ENTMCNC: 33.1 G/DL (ref 31.5–35.7)
MCV RBC AUTO: 91 FL (ref 79–97)
MONOCYTES # BLD AUTO: 0.4 X10E3/UL (ref 0.1–0.9)
MONOCYTES NFR BLD AUTO: 8 %
NEUTROPHILS # BLD AUTO: 2.6 X10E3/UL (ref 1.4–7)
NEUTROPHILS NFR BLD AUTO: 59 %
OBSERVATION IMP: NORMAL
PLATELET # BLD AUTO: 268 X10E3/UL (ref 150–450)
POTASSIUM SERPL-SCNC: 4.4 MMOL/L (ref 3.5–5.2)
PROT SERPL-MCNC: 7 G/DL (ref 6–8.5)
RBC # BLD AUTO: 5.15 X10E6/UL (ref 4.14–5.8)
RPR SER QL: NON REACTIVE
SODIUM SERPL-SCNC: 140 MMOL/L (ref 134–144)
TRIGL SERPL-MCNC: 63 MG/DL (ref 0–149)
VLDLC SERPL CALC-MCNC: 12 MG/DL (ref 5–40)
WBC # BLD AUTO: 4.4 X10E3/UL (ref 3.4–10.8)

## 2025-07-21 RX ORDER — HYDROXYZINE PAMOATE 25 MG/1
25 CAPSULE ORAL 3 TIMES DAILY PRN
Qty: 30 CAPSULE | Refills: 0 | Status: SHIPPED | OUTPATIENT
Start: 2025-07-21 | End: 2025-07-31

## 2025-08-29 DIAGNOSIS — R53.83 FATIGUE, UNSPECIFIED TYPE: Primary | ICD-10-CM

## 2025-08-29 DIAGNOSIS — R68.82 LOW LIBIDO: ICD-10-CM
